# Patient Record
Sex: MALE | Race: WHITE
[De-identification: names, ages, dates, MRNs, and addresses within clinical notes are randomized per-mention and may not be internally consistent; named-entity substitution may affect disease eponyms.]

---

## 2019-11-01 ENCOUNTER — HOSPITAL ENCOUNTER (OUTPATIENT)
Dept: HOSPITAL 84 - D.HCCECHO | Age: 84
Discharge: HOME | End: 2019-11-01
Attending: INTERNAL MEDICINE
Payer: MEDICARE

## 2019-11-01 DIAGNOSIS — R09.89: ICD-10-CM

## 2019-11-01 DIAGNOSIS — I10: Primary | ICD-10-CM

## 2019-11-06 NOTE — EC
PATIENT:KARLEE CROCKER                 DATE OF SERVICE: 11/01/19
SEX: M                                  MEDICAL RECORD: [id number]
DATE OF BIRTH: [birth year]                        [facility]          
AGE OF PATIENT: 84                             ADMISSION DATE: 11/01/19
 
REFERRING PHYSICIAN:                               
 
INTERPRETING PHYSICIAN: MILY MEDINA MD          
 
 
 
                             ECHOCARDIOGRAM REPORT
  ECHO CHARGES 4               ECHO COMPLETE                 Date: 11/01/19
 
 
 
CLINICAL DIAGNOSIS: ANGINA/PACK/MURMUR/ABNORMAL EKG
 
                         ECHOCARDIOGRAPHIC MEASUREMENTS
      (adult normal given)
   AC root (d.<3.7cm) 3.5  cm   LV Septum d (<1.2 cm> 1.1  cm
      Valve Excursion 1.9  cm     LV Septum (systole) 1.4  cm
Left Atria (s.<4.0cm> 2.4  cm          LVPW d(<1.2cm) 1.1  cm
        RV (d.<2.3cm) 2.7  cm           LVPW (sytole) 1.7  cm
  LV diastole(<5.6CM) 6.2  cm       MV E-F(>70mm/sec)      cm
           LV systole 3.8  cm           LVOT Diameter 1.8  cm
       MV exc.(>10mm)      cm
Est.ejection fraction (50-75%)     %
 
   DOPPLER:
     LVIT      cm/sec A 72.0 cm/sec E 104   cm/sec
       LA      cm/sec      RVSP 33.0 mmHg
     LVOT 135  cm/sec   AOP1/2T      m/s
  Asc. Ao 161  cm/sec
     RVOT 63.0 cm/sec
       RA      cm/sec
       PA 76.0 cm/sec
 AV Gradient Peak 10.4 mmHg  AV Mean 5.1  mmHg  AV Area 2.4  cm
 MV Gradient Peak 5.9  mmHg  MV Mean 2.0  mmHg  MV Area      cm
   COMMENTS: OP - HC                                      
 
 
 Cardiac Sonographer: 1               LYN Sterling            
      Cardiologist: 3          Dr. Garcia             
             TAPE# PACS           
                                       Pericardial Effusion N                        
 
 
DATE OF SERVICE:  
 
Adequate 2D, color flow, spectral Doppler, and M-Mode.  No LVH.  LV internal
dimensions are mildly dilated at 6.2 cm.  LV wall motion is normal.  EF is
greater than or equal to 55%.  The aortic valve is tricuspid.  No evidence of
stenosis by Doppler interrogation.  Left atrium normal at 3.4 cm.  Mitral valve
shows no prolapse.  Trace MR.  Right-sided chambers are grossly normal.  Trace
TR.
 
TRANSINT:[id number] Voice Confirmation ID: [id number] DOCUMENT ID: [id number]
 
 
 
ECHOCARDIOGRAM REPORT                          [id number]    KARLEE CROCKER GREGORY A MD          
 
 
 
Electronically Signed by MILY MEDINA on 11/06/19 at 1338
 
 
 
 
 
 
 
 
 
 
 
 
 
 
 
 
 
 
 
 
 
 
 
 
 
 
 
 
 
 
 
 
 
 
 
 
 
 
 
 
CC:                                                             [id number]
DICTATION DATE: 11/04/19 1245     :     11/04/19 1309      DEP CLI 
                                                                      11/01/19
[place] [id number] [place] [id number]

## 2020-04-18 ENCOUNTER — HOSPITAL ENCOUNTER (INPATIENT)
Dept: HOSPITAL 84 - D.ER | Age: 85
LOS: 4 days | Discharge: HOME | DRG: 243 | End: 2020-04-22
Attending: FAMILY MEDICINE | Admitting: FAMILY MEDICINE
Payer: MEDICARE

## 2020-04-18 VITALS — SYSTOLIC BLOOD PRESSURE: 183 MMHG | DIASTOLIC BLOOD PRESSURE: 85 MMHG

## 2020-04-18 VITALS — DIASTOLIC BLOOD PRESSURE: 77 MMHG | SYSTOLIC BLOOD PRESSURE: 171 MMHG

## 2020-04-18 VITALS — SYSTOLIC BLOOD PRESSURE: 176 MMHG | DIASTOLIC BLOOD PRESSURE: 79 MMHG

## 2020-04-18 VITALS
HEIGHT: 68 IN | BODY MASS INDEX: 18.68 KG/M2 | WEIGHT: 123.26 LBS | BODY MASS INDEX: 18.68 KG/M2 | WEIGHT: 123.26 LBS | HEIGHT: 68 IN

## 2020-04-18 VITALS — DIASTOLIC BLOOD PRESSURE: 86 MMHG | SYSTOLIC BLOOD PRESSURE: 216 MMHG

## 2020-04-18 VITALS — SYSTOLIC BLOOD PRESSURE: 183 MMHG | DIASTOLIC BLOOD PRESSURE: 72 MMHG

## 2020-04-18 DIAGNOSIS — N17.9: ICD-10-CM

## 2020-04-18 DIAGNOSIS — I49.5: Primary | ICD-10-CM

## 2020-04-18 DIAGNOSIS — E78.5: ICD-10-CM

## 2020-04-18 DIAGNOSIS — I25.10: ICD-10-CM

## 2020-04-18 DIAGNOSIS — I44.4: ICD-10-CM

## 2020-04-18 DIAGNOSIS — I16.1: ICD-10-CM

## 2020-04-18 LAB
ALBUMIN SERPL-MCNC: 4 G/DL (ref 3.4–5)
ALP SERPL-CCNC: 86 U/L (ref 30–120)
ALT SERPL-CCNC: 26 U/L (ref 10–68)
ANION GAP SERPL CALC-SCNC: 11.7 MMOL/L (ref 8–16)
BILIRUB SERPL-MCNC: 0.61 MG/DL (ref 0.2–1.3)
BUN SERPL-MCNC: 26 MG/DL (ref 7–18)
CALCIUM SERPL-MCNC: 9.1 MG/DL (ref 8.5–10.1)
CHLORIDE SERPL-SCNC: 103 MMOL/L (ref 98–107)
CK MB SERPL-MCNC: 1.9 U/L (ref 0–3.6)
CO2 SERPL-SCNC: 30.2 MMOL/L (ref 21–32)
CREAT SERPL-MCNC: 0.7 MG/DL (ref 0.6–1.3)
EOSINOPHIL NFR BLD: 1 % (ref 0–7)
ERYTHROCYTE [DISTWIDTH] IN BLOOD BY AUTOMATED COUNT: 14.8 % (ref 11.5–14.5)
GLOBULIN SER-MCNC: 3.5 G/L
GLUCOSE SERPL-MCNC: 95 MG/DL (ref 74–106)
HCT VFR BLD CALC: 42.8 % (ref 42–54)
HGB BLD-MCNC: 14.1 G/DL (ref 13.5–17.5)
LYMPHOCYTES NFR BLD AUTO: 63 % (ref 15–50)
MCH RBC QN AUTO: 31.9 PG (ref 26–34)
MCHC RBC AUTO-ENTMCNC: 32.9 G/DL (ref 31–37)
MCV RBC: 96.8 FL (ref 80–100)
NEUTROPHILS NFR BLD AUTO: 36 % (ref 40–80)
NT-PROBNP SERPL-MCNC: 114 PG/ML (ref 0–450)
OSMOLALITY SERPL CALC.SUM OF ELEC: 285 MOSM/KG (ref 275–300)
PLATELET # BLD EST: NORMAL 10*3/UL
PLATELET # BLD: 157 10X3/UL (ref 130–400)
PMV BLD AUTO: 11.3 FL (ref 7.4–10.4)
POTASSIUM SERPL-SCNC: 3.9 MMOL/L (ref 3.5–5.1)
PROT SERPL-MCNC: 7.5 G/DL (ref 6.4–8.2)
RBC # BLD AUTO: 4.42 10X6/UL (ref 4.2–6.1)
SODIUM SERPL-SCNC: 141 MMOL/L (ref 136–145)
TROPONIN I SERPL-MCNC: < 0.017 NG/ML (ref 0–0.06)
WBC # BLD AUTO: 7 10X3/UL (ref 4.8–10.8)

## 2020-04-18 NOTE — HEMODYNAMI
PATIENT:KARLEE CROCKER                                  MEDICAL RECORD: [id number]
: 35                                            LOCATION:[place] [address]
ACCT# [id number]                                       ADMISSION DATE: 20
 
 
 Generatedon:202014:26
Patient name: KARLEE CROCKER Patient #: [id number] Visit #: [id number] SSN: [id number]
[id number] :
1935 Date of study: 2020
Page: Of
Hemodynamic Procedure Report
****************************
Patient Data
Patient Demographics
Procedure consent was obtained
First Name: KARLEE           Gender: Male
Last Name:           : 1935
Middle Initial: L           Age: 84 year(s)
Patient #: [id number]       Race: 
Visit #: [id number]
SSN: [id number]
Accession #:
[id number]
Additional ID: [id number]
Contact details
Address: [address] [place] [address]       Phone: [phone]
State: [place]
City: [place]
Zip code: [address]
Past Medical History
Allergies
Allergen        Reaction        Date         Comments
Reported
Other allergy                   2020    BIAXIN,
AZITHROMYCIN,
HYDRALIZINE,
DILTIAZEM
Admission
Admission Data
Admission Date: 2020   Admission Time: 17:30
Arrival Date: 2020     Arrival Time: 0:00
Admit Source: Other         Insurance Payor: Medicare
Room #: [address] #: [id number]
Height (in.): 67.72         BSA: 1.7 (m2)
Height (cm.): 172           BMI: 19.94 (kg/m2)
Weight (lbs.): 130.07
Weight (kg.): 59
Lab Results
Lab Result Date: 2020  Lab Result Time: 0:00
Biochemistry
Name         Units    Result                Min      Max
BUN          mg/dl    24       --(----)-*   7        18
Creatinine   mg/dl    1        --(--*-)--   0.6      1.3
eGFR         ml/min   76.70622 *-(----)--   90       120
NONAFRICAN
CBC
Name         Units    Result                Min      Max
Hemoglobin   g/dl     14.7     --(-*--)--   13.5     17.5
Procedure
 
Procedure Types
Cath Procedure
Diagnostic Procedure
PPM/ICD
PPM Dual Implant
Sedation Charges
Moderate Sedation up to 30 minutes
Procedure Description
Procedure Date
Procedure Date: 2020
Procedure Start Time: 13:35
Procedure End Time: 14:24
Procedure Staff
Name                            Function
Henry Perez MD              Performing Physician
Manny Vallecillo MD             Assisting physician
Aracely Ospina RT              Monitor
Carole Villafuerte RT               Scrub
Jaylin Sorto RN                Nurse
Rashaun Pierre RN                  Circulator
Procedure Data
Cath Procedure
Fluoroscopy
Diagnostic fluoroscopy      Total fluoroscopy Time:
time: 11.6 min              11.6 min
Diagnostic fluoroscopy      Total fluoroscopy dose:
dose: 93.54 mGy             93.54 mGy
Estimated blood loss: 10 ml
Procedure Complications
No complications
Procedure Medications
Medication           Administration Route Dosage
0.9% NaCl            I.V.                 100 ml/hr
Oxygen               etCO2 Nasal cannula  2 l/min
Lidocaine 1%         added to field       20
Ancef (1Gm/50ml NS)  I.V.P.B              1 g
Ancef Irrigation     Topical              1 g
(1gm/500ml NS)
Versed               I.V.                 2 mg
Fentanyl             I.V.                 50 mcg
Hemodynamics
Rest
BSA: 1.7 (m2) HGB: 14.7 (g/dl) O2 Consumption: Estimated: 199.91 (ml/min) O2 Con
sumption
indexed: Estimated:117.59 (ml/min/m) Heart Rate: 80 (bpm)
Snapshots
Pre Cath      Intra         NCS           Post Cath
Vital Signs
Time     Heart  Resp   SPO2 etCO2   NIBP (mmHg) Rhythm  Pain    Sedation
Rate   (ipm)  (%)  (mmHg)                      Status  Level
(bpm)
13:22:39 83     11     98   26.2    160/97(140) NSR     0 (11)  10(A)
, No
pain
13:26:51 79     13     97   18.7    145/81(109) NSR     0 (11)  10(A)
, No
pain
13:30:59 83     24     97   23      135/80(107) NSR     0 (11)  10(A)
 
, No
pain
13:35:02 81     11     97   22.5    134/80(111) NSR     0 (11)  10(A)
, No
pain
13:39:04 98     14     98   15.7    137/81(109) NSR     0 (11)  9(A)
, No
pain
13:43:08 84     21     97   17.2    137/81(116) NSR     0 (11)  9(A)
, No
pain
13:47:11 80     12     98   3.7     133/83(107) NSR     0 (11)  10(A)
, No
pain
13:51:17 92     8      97   1.5     92/70(86)   NSR     0 (11)  9(A)
, No
pain
13:55:52 76     16     98   0.7     130/81(100) NSR     0 (11)  9(A)
, No
pain
13:59:57 74     10     97   4.4     141/71(102) NSR     0 (11)  9(A)
, No
pain
14:04:05 71     16     98   1.4     137/77(102) NSR     0 (11)  9(A)
, No
pain
14:08:11 71     8      97   3.7     140/79(114) NSR     0 (11)  9(A)
, No
pain
14:12:17 80     9      97   12.7    132/82(110) NSR     0 (11)  10(A)
, No
pain
14:14:36 79     8      98   5.2     134/78(103) NSR     0 (11)  10(A)
, No
pain
14:18:40 73     11     98   3.7     137/77(116) NSR     0 (11)  10(A)
, No
pain
14:22:39 79     15          0       136/90(117) NSR     0 (11)  10(A)
, No
pain
Medications
Time     Medication  Route   Dose  Verified  Delivered Reason     Notes  Effecti
veness
by        by
13:18:47 0.9% NaCl   I.V.    100   Henry   Jaylin     used for
ml/hr Chris   Macario   procedure
MD        RN
13:18:53 Oxygen      etCO2   2     Henry   Jaylin     used for
Nasal   l/min Chris   Macario   procedure
cannula       MD        RN
13:19:03 Lidocaine   added   20ml  Manny Bryant for local
1%          to      vial  Ismael Vallecillo MD anesthetic
field   x 2
13:19:12 Ancef       I.V.P.B 1 g   Church Jaylin     used for
(1Gm/50ml                 Ismael Sorto   procedure
NS)                                 RN
13:19:19 Ancef       Topical 1 g   [facility] Jaylin     used for
Irrigation                Ismael Sorto   procedure
(1gm/500ml                          RN
 
NS)
13:29:51 Versed      I.V.    2 mg  Henry   Jaylin     for
Chris   Macario   sedation
MD        RN
13:29:56 Fentanyl    I.V.    50    Henry conn
mcg   [facility] Sorto   sedation
MD        RN
13:35:18 Fentanyl    I.V.    50    Henry conn
mcg   [facility] Sorto   sedation
MD        RN
13:49:50 Fentanyl    I.V.    50    Henry Arciniega MD, RN
Procedure Log
Time     Note
12:13:41 Informed consent obtained and on chart
12:13:47 Patient Weight : 130.07 lbs
12:13:47 Patient Height : 67.72 inches
13:01:54 Procedure Status PPM/ Gen Change/ Lead Revision/ Temp.
13:01:56 Time tracking: Regular hours (M-F 7:00 - 5:00)
13:01:59 Plan of Care:Hemodynamics will remain stable., Cardiac
rhythm will remain stable., Comfort level will be
maintained., Respiratory function will remain
adequate., Patient/ family verbilizes understanding of
procedure., Procedure tolerated without complication.,
Recovers from procedure without complications..
13:02:13 Rashaun Pierre RN sent for patient. Start room use.
13:02:25 H&P Date Dictated: 2020 ER History on chart..
13:03:01 Patient allergic to Other allergyBIAXIN, AZITHROMYCIN,
HYDRALIZINE, DILTIAZEM
13:04:05 Medtr[employer] representative MILTON PHILIP present for
procedure.
13:12:58 Patient received from Med II to CCL 3 Alert and
oriented. Tansferred to table in Supine position.
13:13:01 Warm blankets applied, and dieter hugger turned on for
patient comfort.
13:13:01 Correct patient and procedure confirmed by team.
13:13:02 ECG and BP/O2 sat monitors applied to patient.
13:18:38 Vital chart was started
13:18:47 0.9% NaCl 100 ml/hr I.V. was administered by Jaylin Sorto RN; used for procedure; Verbal order read back
and verified.
13:18:53 Oxygen 2 l/min etCO2 Nasal cannula was administered by
Jaylin Sorto RN; used for procedure; Verbal order
read back and verified.
13:19:03 Lidocaine 1% 20ml vial x 2 added to field was
administered by Manny Vallecillo MD; for local
anesthetic; Verbal order read back and verified.
13:19:12 Ancef (1Gm/50ml NS) 1 g I.V.P.B was administered by
Jaylin Sorto RN; used for procedure; Verbal order
read back and verified.
13:19:19 Ancef Irrigation (1gm/500ml NS) 1 g Topical was
administered by Jaylin Sorto RN; used for procedure;
Verbal order read back and verified.
13::53 Baseline sample Acquired.
13:27:20 Full Disclosure recording started
13::22 Pre-procedure instructions explained to patient.
13::23 Pre-op teaching completed and patient verbalized
understanding.
13:27:25 Family unavailable.
 
13:27:28 Patient NPO since Midnight.
13:27:35 Is the patient allergic to Iodine/contrast media? No.
13:27:38 Was the patient premedicated? Yes
13:27:45 Is patient on blood thinner?No
13:27:52 Patient diabetic? No.
13:27:54 ----Pre-sedation anethsthesia assessment.----
13:27:57 Previous problem with sedation/anesthesia? No ?
13:27:59 Snore? Yes
13:28:01 Sleep apnea? No
13:28:04 Deviated septum? Unknown
13:28:07 Opens mouth fully? Yes
13:28:11 Sticks out tongue? Yes
13:28:15 Airway obstruction? No ?
13:28:18 Dentures? No ?
13:28:35 IV patent on arrival in left forearm with 0.9% NaCl at
[facility].
13:28:47 Lab results completed and on chart.
13:28:58 Left chest area was prepped with chlora-prep and
draped in sterile fashion
13:29:00 Alarms reviewed by R. N.
13:29:01 Sharps counted by scrub and verified by R.N.
13:29:05 Physician arrived
13:29:06 --------ALL STOP TIME OUT------
13:29:08 Final Timeout: patient, procedure, and site verified
with staff and physician. All members of the team are
in agreement.
13:29:13 Left chest site verified by team.
13:29:27 Fire Safety Assessment: A--An alcohol-based skin
anteseptic being used preoperatively., B--The
operative or invasive procedure is being performed
above the xiphoid process or in the oropharynx.,
C--Open oxygen or nitrous oxide is being used.,
E--There are other possible contributors.
13:29:33 Physical assessment completed. ASA score P 2 - A
patient with mild systemic disease as per Henry Perez MD.
13:29:39 Sedation plan: IV Moderate Sedation Medication:Versed,
Fentanyl
13:29:50 Use device set ISMAEL PPM
13:29:51 Versed 2 mg I.V. was administered by Jaylin Sorto RN;
for sedation; Verbal order read back and verified.
13:29:53 2-0 Ticron Multipack ([id number]) opened to sterile
field.
13:29:53 3-0 Vicryl Single Pack [id number] opened to sterile
field.
13:29:54 5-0 Monocryl PS2 Y495G opened to sterile field.
13:29:55 Cautery Tip  opened to sterile field.
13:29:56 Fentanyl 50 mcg I.V. was administered by Jaylin Sorto
RN; for sedation; Verbal order read back and verified.
13:29:56 Cautery Pushbutton Pencil opened to sterile field.
13:29:57 Mepilex Dressing (994027) opened to sterile field.
13:30:38 Pre sharps counted by scrub and verified by RN:
Sutures: 7; Sponges: 5; Stick needles: 2; Skin
needles: 2; Blade: 1; Cautery: 1
13:30:51 Grounding pad site Left thigh.
13:30:53 Grounding pad site free from injury.
13:33:39 Procedure started.
13:33:49 Rhythm: sinus rhythm
13:34:41 Medtronic 4574-45 PPM Lead opened to sterile field.
13:34:46 Medtronic 4074-52 PPM Lead opened to sterile field.
 
13:35:13 Medtronic ESTEBAN XT DR Generator [id number] opened to
sterile field.
13:35:18 Fentanyl 50 mcg I.V. was administered by Jaylin Sorto
RN; for sedation; Verbal order read back and verified.
13:35:38 Lidocaine 1% was administered to left subclavicular
area by Manny Vallecillo MD .
13:35:44 Incision made to left subclavicular area.
13:38:10 Generator pocket made.
13:42:10 Left subclavian vein accessed with 7Fr Peel Away
Sheath.
13:42:15 Left subclavian vein accessed with 7Fr Peel Away
Sheath.
13:46:23 Ventricular lead inserted and advanced.
13:46:27 Atrial lead inserted and advanced.
13:49:40 Immobilizer Sling Medium opened to sterile field.
13:49:50 Fentanyl 50 mcg I.V. was administered by Jaylin Sorto RN; for sedation; Verbal order read back and verified.
13:51:41 Ventricular lead positioned.
13:51:46 Ventricular lead tested.
14:05:50 Atrial lead positioned.
14:05:59 Atrial lead tested.
14:07:28 Peel-a-way sheath was split and removed.
14:07:28 Peel-a-way sheath was split and removed.
14:10:30 PPM Dual was attached to lead(s) and inserted into
pocket.
14:10:35 PPM Dual was inserted subcutaneously to left chest.
14:10:59 Device pocket was irrigated with Ancef.
14:11:09 Atrial lead attachment was completed with 2-0 ticron.
14:11:15 Ventricular lead attachment was completed with 2-0
ticron.
14:11:21 Generator was sutured in place with 2-0 ticron.
14:15:02 Subcutaneous closure was completed with 3-0 vicryl
plus.
14:16:25 Skin closure was completed with 5-0 monocryl.
14:16:57 Parameters-- Generator: Mode: DDDR. Lower Rate: 60bpm.
Upper Rate: 120bpm.
14:17:42 Parameters--Ventricular P/R Wave: 11.2mV. Current:
0.1mA; Threshold: 1441V; Impedence: 1441OHMS.
14:18:38 Parameters--Atrial P/R Wave: 3.7mV. Current: 9.6mA;
Threshold: 9.4V; Impedence: 699OHMS.
14:19:36 Lt Chest incision was dressed with Mepilex dressing.
14:19:45 Procedure ended.(Physican Out)
14:21:08 Fluoroscopy time 11.60 minutes.
14:22:10 Fluoroscopy dose: 93.54 mGy
14:22:10 Flurop Dose total: 93.54
14:22:21 Dose Area Product 2577.60 mGy/cm.
14:22:25 Sharps counted by scrub and verified by R.N.
14:22:49 Post sharps counted by scrub and verified by RN:
Sutures: 7; Sponges: 5; Stick needles: 2; Skin
needles: 2; Blade: 1; Cautery: 1
14:23:03 Insertion/operative site no bleeding no hematoma.
14:23:14 Post Chest area:stable
14:23:20 Post-procedure physical assessment completed. ASA
score P 2 - A patient with mild systemic disease as
per Henry Perez MD.
14:23:25 Post procedure rhythm: paced
14:23:30 Estimated blood loss: 10 ml
14:23:32 Post procedure instruction explained to
patient.Patient verbalizes understanding.
14:23:33 Patient needs reinforcement of post procedure
 
teaching.
14:23:44 Procedure type changed to Cath procedure, Diagnostic
procedure, PPM/ICD, PPM Dual Implant, Sedation
Charges, Moderate Sedation up to 30 minutes
14:23:47 Procedure and supply charges have been captured,
reviewed, submitted and are correct.
14:24:33 Procedure Complication : No complications
14:24:36 Vital chart was stopped
14:24:41 Operative report dictated upon procedure completion.
14:24:42 See physician's report for complete and final results.
14:24:44 Report given to [facility] II.
14:24:52 Patient transfered to Med II with Bed.
14:24:56 Procedure ended.
14:24:56 Full Disclosure recording stopped
14:25:00 End room use (Document Last)
Device Usage
Item Name    Manufacture  Quantity  Catalog     Hospital Part     Current [facility]
l Lot# /
Number      Charge   Number   Stock   Stock   Serial#
Code
2-0 Ticron   [employer]      1         [id number]  881506   46415    930658  5
Multipack
([id number])
3-0 Vicryl   [employer]      1         [id number]     896033   149316   376174  5
Single Pack
[id number]
5-0 Monocryl [employer]      1         Y495G       493263   540318   305799  5
PS2 Y495G
Cautery Tip  [employer]     1         [id number]    899209   253712   429287  5
[employer]
Cautery      [employer]     1         [id number]      512991   34516    845263  5
Pushbutton   [employer]
Pencil
Mepilex      Cardinal     1         986363      784108   517555   679366  5
Dressing     Health
(549411)
Medtronic    Medtronic    1         4574-45     514172   929406   347807  5     
  [id number]
4574-45 PPM                                                                     
  2021
Lead
Medtronic    Medtronic    1         4074-52     530302   407280   895973  5     
  [id number]
4074-52 PPM                                                                     
  2022
Lead
Medtronic    Medtronic    1         W1DR01      447682   0114635  936178  5     
  [id number]
ESTEBAN XT DR                                                                     
  2021
Generator
W1DR01
Immobilizer  Cardinal     1         [id number]    038838   266316   072301  5
Sling Medium Health
Signature Audit Cameron
Stage           Time        Signature      Unsigned
Intra-Procedure 2020   Aracely
2:25:25 PM  Anai
RT(R) (CV)
Intra-Procedure 2020   Jaylin Sorto
 
2:26:18 PM  RN
Intra-Procedure 2020   Henry Olson
2:26:55 PM  Ori REYES
 
 
 
 
 
 
 
 
 
 
 
 
 
 
 
 
 
 
 
 
 
 
 
 
 
 
 
 
 
 
 
 
 
 
 
 
 
 
 
 
 
 
 
 
 
 
 
 
 
 
 
 
[place] [address] [place] [address]

## 2020-04-18 NOTE — HEMODYNAMI
PATIENT:KARLEE CROCKER                                  MEDICAL RECORD: [id number]
: 35                                            LOCATION:[facility]     D.2123
ACCT# [id number]                                       ADMISSION DATE: 20
 
 
 Generatedon:202010:48
Patient name: KARLEE CROCKER Patient #: [id number] Visit #: [id number] SSN: [id number]
[id number] :
1935 Date of study: 2020
Page: Of
Hemodynamic Procedure Report
****************************
Patient Data
Patient Demographics
Procedure consent was obtained
First Name: KARLEE           Gender: Male
Last Name:           : 1935
Middle Initial: L           Age: 84 year(s)
Patient #: [id number]       Race: 
Visit #: [id number]
SSN: [id number]
Accession #:
[id number]
Additional ID: [id number]
Contact details
Address: [address] [place] [address]       Phone: [phone]
State: [place]
City: [place]
Zip code: [address]
Admission
Admission Data
Admission Date: 2020   Admission Time: 17:30
Arrival Date: 2020     Arrival Time: 0:00
Admit Source: Other         Insurance Payor: Medicare
Room #: D.2123              [facility] #: [id number]
Height (in.): 67.72         BSA: 1.7 (m2)
Height (cm.): 172           BMI: 19.94 (kg/m2)
Weight (lbs.): 130.07
Weight (kg.): 59
Lab Results
Lab Result Date: 2020  Lab Result Time: 0:00
Biochemistry
Name         Units    Result                Min      Max
BUN          mg/dl    24       --(----)-*   7        18
Creatinine   mg/dl    1        --(--*-)--   0.6      1.3
eGFR         ml/min   76.77148 *-(----)--   90       120
NONAFRICAN
CBC
Name         Units    Result                Min      Max
Hemoglobin   g/dl     14.7     --(-*--)--   13.5     17.5
Procedure
Procedure Types
Cath Procedure
Diagnostic Procedure
C
LH w/Coronaries
Sedation Charges
Moderate Sedation up to 30 minutes
PCI Procedure
 
Coronary Stent
Coronary Stent Initial
Hemochron ACT Test
Peripheral Cath Diagnostic Procedure
Cath Lab Peripheral Procedures
Renal Arteriogram
Procedure Description
Procedure Date
Procedure Date: 2020
Procedure Start Time: 10:19
Procedure End Time: 10:44
Procedure Staff
Name                            Function
Henry Perez MD              Performing Physician
Shayy Valle RT                    Monitor
Jazmyne Tompkins RT                Scrub
Rashaun Pierre RN                  Nurse
Procedure Data
Cath Procedure
Fluoroscopy
Diagnostic fluoroscopy      Total fluoroscopy Time: 5.6
time: 5.6 min               min
Diagnostic fluoroscopy      Total fluoroscopy dose: 646
dose: 646 mGy               mGy
Contrast Material
Contrast Material Type                       Amount (ml)
Isovue 300                                   149
Entry Location
Entry     Primary  Successful  Side  Size  Upsize Upsize Entry    Closure Succes
sful  Closure
Location                             (Fr)  1 (Fr) 2 (Fr) Remarks  Device        
      Remarks
Femoral                        Right 5 Fr  6 Fr                   Exoseal
artery                                     Short
Estimated blood loss: 5 ml
Diagnostic catheters
Device Type               Used For           End Catheter
Placement
MULTIPACK JL 4.0 5Fr      Left Coronary
catheter                  Angiography
MULTIPACK 3DRC 5Fr        Right Coronary
catheter                  Angiography
MULTIPACK Pigtail 5 Fr    Right Coronary
catheter                  Angiography
MULTIPACK 3DRC 5Fr        Right Coronary
catheter                  Angiography
Procedure Complications
No complications
Procedure Medications
Medication           Administration Route Dosage
Oxygen               etCO2 Nasal cannula  2 l/min
Lidocaine 2%         added to field       20
Heparin Flush Bag    added to field       2 bags
(1000units/500ml NS)
0.9% NaCl            I.V.                 100 ml/hr
Versed               I.V.                 1 mg
Fentanyl             I.V.                 50 mcg
Heparin Bolus        I.V.                 4000 units
Integrilin (Bolus    I.V.                 5 ml
2mg/ml)
 
Plavix               P.O.                 600 mg
Hemodynamics
Rest
BSA: 1.7 (m2) HGB: 14.7 (g/dl) O2 Consumption: Estimated: 189.18 (ml/min) O2 Con
sumption indexed:
Estimated:111.28 (ml/min/m) Heart Rate: 64 (bpm)
Pressure Samples
Time     Site     Value (mmHg) Purpose      Heart      Use
Rate(bpm)
10:26    LV       120/-11,-11  Snapshot     66
Gradients
Valve  Time  Site Site Mean    SEP/DFP    Peak To Heart  Use
1    2    (mmHg)  (sec/min)  Peak    Rate
(mmHg)  (bpm)
Aortic 10:26 LV   AO                              64
Snapshots
Pre Cath      Intra         NCS           Post Cath
Vital Signs
Time     Heart  Resp   SPO2 etCO2   NIBP (mmHg) Rhythm  Pain    Sedation
Rate   (ipm)  (%)  (mmHg)                      Status  Level
(bpm)
10:13:34 70     15     97   15.6    159/84(122) NSR     0 (11)  10(A)
, No
pain
10:17:52 69     16     94   15.6    143/75(105) NSR     0 (11)  10(A)
, No
pain
10:22:06 65     15     97   0       143/74(121) NSR     0 (11)  9(A)
, No
pain
10:27:21 67     12     98   0       142/76(126) NSR     0 (11)  9(A)
, No
pain
10:32:45 60     12     98   13.4    151/67(135) NSR     0 (11)  9(A)
, No
pain
10:37:01 64     13     98   8.2     152/77(133) NSR     0 (11)  9(A)
, No
pain
10:42:23 67     13     98   38.1    155/84(139) NSR     0 (11)  10(A)
, No
pain
Medications
Time     Medication       Route   Dose  Verified Delivered Reason          Notes
    Effectiveness
by       by
10:10:31 Oxygen           etCO2   2     Henry Rodney    used for
Nasal   l/min St Ori Pierre RN   procedure
cannula       MD
10:10:41 Lidocaine 2%     added   20ml  Henry Figueroa   for local
to      vial  [facility]   anesthetic
field MD REYES
10:10:48 Heparin Flush    added   2     Henry Figueroa   used for
Bag              to      bags  [facility]   procedure
(1000units/500ml field         MD       MD
NS)
10:10:57 0.9% NaCl        I.V.    100   Hnery Rodney    Per physician
ml/hr [facility] Ori Pierre RN, MD
10:15:40 Versed           I.V.    1 mg  Henry Rodney    for sedation
 
[facility] Ori Pierre RN, MD
10:15:46 Fentanyl         I.V.    50    Henry Rodney    for sedation
mcg   [facility] Ori Pierre RN, MD
10:25:15 Heparin Bolus    I.V.    4000  Henry Rodney    for             verif
ied
units St Ori Pierre RN   anticoagulation with dr MD mccann
10:29:32 Integrilin       I.V.    5 ml  Henry Rodney    for             waste
d 5
(Bolus 2mg/ml)                 St Ori Pierre RN   antiplatelet    ml of
MD                 therapy         vial
10:43:00 Plavix           P.O.    600   Henry Rodney    for
mg    Chris  Bell RN   antiplatelet
MD                 therapy
Procedure Log
Time     Note
9:40:53  Informed consent obtained and on chart
9:41:10  Diagnostic Cath Status : Urgent
9:41:27  Arrival Date: 2020 12:00:00 AM
9:41:28  Admit Source: Other
9:41:30  Insurance Payor : Medicare
9:42:05  Procedure Status Urgent Heart Cath (IP), Peripheral.
9:42:10  Time tracking: Regular hours (M-F 7:00 - 5:00)
9:42:15  Plan of Care:Hemodynamics will remain stable., Cardiac
rhythm will remain stable., Comfort level will be
maintained., Respiratory function will remain
adequate., Patient/ family verbilizes understanding of
procedure., Procedure tolerated without complication.,
Recovers from procedure without complications..
9:52:03  Rashaun Pierre RN sent for patient. Start room use.
9:54:17  Lab Result : BUN 24 mg/dl
9:54:17  Lab Result : eGFR NONAFRICAN 76.67520 ml/min
9:54:17  Lab Result : Hemoglobin 14.7 g/dl
9:54:17  Lab Result : Creatinine 1 mg/dl
9:56:18  Risk of Mortality: 0.1
9:56:23  Risk of blood transfusion: 1.7
9:56:27  Risk of LYNDSEY: 3.3
9:58:57  Patient Height : 67.72 inches
9:59:03  Patient Weight : 130.07 lbs
10:00:43 Patient received from Med II to CCL 1 Alert and
oriented. Tansferred to table in Supine position.
10:00:44 Warm blankets applied, and dieter hugger turned on for
patient comfort.
10:00:45 Correct patient and procedure confirmed by team.
10:00:46 ECG and BP/O2 sat monitors applied to patient.
10:10:31 Oxygen 2 l/min etCO2 Nasal cannula was administered by
Rashaun Pierre RN; used for procedure; Verbal order read
back and verified.
10:10:41 Lidocaine 2% 20ml vial added to field was administered
by Henry Perez MD; for local anesthetic; Verbal
order read back and verified.
10:10:48 Heparin Flush Bag (1000units/500ml NS) 2 bags added to
field was administered by Henry Perez MD; used for
procedure; Verbal order read back and verified.
10:10:57 0.9% NaCl 100 ml/hr I.V. was administered by Rashaun Pierre RN; Per physician; Verbal order read back and
verified.
10:12:25 Baseline sample Acquired.
 
10:12:25 Vital chart was started
10:12:30 Rhythm: sinus rhythm
10:12:33 Full Disclosure recording started
10:12:52 IV Extension Set opened to sterile field.
10:12:59 Pre-procedure instructions explained to patient.
10:13:00 Pre-op teaching completed and patient verbalized
understanding.
10:13:02 Family unavailable.
10:13:03 Patient NPO since Midnight.
10:13:06 Is the patient allergic to Iodine/contrast media? No.
10:13:08 Was the patient premedicated? Yes
10:13:11 Is patient on blood thinner?No
10:13:13 Patient diabetic? No.
10:13:25 Previous problem with sedation/anesthesia? No ?
10:13:27 Snore? No
10:13:28 Sleep apnea? No
10:13:29 Deviated septum? No
10:13:31 Opens mouth fully? Yes
10:13:32 Sticks out tongue? Yes
10:13:34 Airway obstruction? No ?
10:13:37 Dentures? No ?
10:13:40 Pre procedure: right dorsailis pedis pulse 2+ Normal;
easily identifiable; not easily obliterated
10:13:43 Pre procedure: left dorsailis pedis pulse 2+ Normal;
easily identifiable; not easily obliterated
10:13:45 Patient pain scale 0/10 ?.
10:13:54 IV patent on arrival in left forearm with 0.9% NaCl at
KVO.
10:13:57 Lab results completed and on chart.
10:14:09 Stress Test: no; N/A ?
10:14:12 Alarms reviewed by R. N.
10:14:13 Sharps counted by scrub and verified by R.N.
10:14:14 Physician arrived
10:14:15 --------ALL STOP TIME OUT------
10:14:16 Final Timeout: patient, procedure, and site verified
with staff and physician. All members of the team are
in agreement.
10:14:18 Right groin site verified by team.
10:14:22 Fire Safety Assessment: A--An alcohol-based skin
anteseptic being used preoperatively., C--Open oxygen
or nitrous oxide is being used., D--An ESU, laser, or
fiber-optic light is being used.
10:14:26 Physical assessment completed. ASA score P 2 - A
patient with mild systemic disease as per Henry Perez MD.
10:14:29 2) 60-89 Mildly reduced kidney function, and other
findings (as for stage 1) point to kidney disease.
10:14:50 Maximum allowable contrast dose (3.7 X eGFR X 0.75)210
ml.
10:14:55 Sedation plan: IV Moderate Sedation Medication:Versed,
Fentanyl
10:14:59 Use device set Femoral Dx
10:15:01 ACIST Syringe (58774) opened to sterile field.
10:15:02 Bag Decanter (2002S) opened to sterile field.
10:15:02 Medline Cath Pack ([id number]) opened to sterile field.
10:15:03 ACIST Hand Control (35898) opened to sterile field.
10:15:04 ACIST Manifold (33267) opened to sterile field.
10:15:04 DIAGNOSTIC Multipack 5Fr catheter set (WK7971) opened
to sterile field.
10:15:05 Tegaderm 4 x 4 (1626W) opened to sterile field.
 
10:15:06 SHEATH 5FR [place] (YIU229) opened to sterile field.
10:15:06 EMERALD Guide Wire (585-999) opened to sterile field.
10:15:40 Versed 1 mg I.V. was administered by Rashaun Pierre RN;
for sedation; Verbal order read back and verified.
10:15:46 Fentanyl 50 mcg I.V. was administered by Rashaun Pierre
RN; for sedation; Verbal order read back and verified.
10:18:35 Procedure started.
10:19:34 Local anesthetic to right femoral artery with
Lidocaine 2% by Henry Perez MD.**INITIAL ACCESS
ONLY**
10:19:44 A 5 Fr sheath was inserted into the Right Femoral
artery
10:19:54 A MULTIPACK JL 4.0 5Fr catheter was advanced over the
wire and used for Left Coronary Angiography.
10:20:07 Baseline sample Acquired.
10:20:36 LCA angiography performed.
10:20:39 Injector settings: Ml/sec: 3, Volume: 6,
10:22:34 Catheter removed.
10:22:39 A MULTIPACK 3DRC 5Fr catheter was advanced over the
wire and used for Right Coronary Angiography.
10:23:48 RCA angiography performed.
10::51 Injector settings: Ml/sec: 3, Volume: 6,
10:24:24 Catheter removed.
10:24:32 A MULTIPACK Pigtail 5 Fr catheter was advanced over
the wire and used for Right Coronary Angiography.
10:25:15 Heparin Bolus 4000 units I.V. was administered by
Rashaun Pierre RN; for anticoagulation; verified with dr mccann Verbal order read back and verified.
10::23 LV hemodynamics recorded.
10::24 LV gram done using SANTOS
10::27 Injector settings: Ml/sec: 5, Volume: 15,
10:27:01 EF : 55 %
10:27:13 Catheter removed.
10:27:14 Proceeding to intervention.
10:27:40 SHEATH 6FR [place] (ZKV067) opened to sterile field.
10:27:40 INFLATOR Merit BasixCompak (FE8661) opened to sterile
field.
10:27:41 [place] Verrata Plus pressure wire (12046M) opened to
sterile field.
10:28:39 GUIDE 6FR JL 4.0 catheter ([id number]) opened to sterile
field.
10:29:04 Sheath upsized to a 6 Fr Short.
10:29:08 6 Fr JL 4 guide catheter was inserted over the wire
10:29:11 FFR/IFR wire advanced.
10:29:32 Integrilin (Bolus 2mg/ml) 5 ml I.V. was administered
by Rashaun Pierre RN; for antiplatelet therapy; wasted 5
ml of vial Verbal order read back and verified.
10:31:11 Baseline FFR 1.
10:31:15 Wire advanced across lesion.
10:33:31 mLAD lesion measured at 0.79 with IFR
10:37:21 Place stent Inflation Number: 1 A INTEGRITY RX 3.5 x
15 stent ([id number]) was prepped and advanced across
the Prox LAD 80. The stent was deployed at 14 CIPRIANO for
0:30 (min:sec) 0.
10:38:28 Stent catheter was removed intact over wire.
10:38:34 Wire removed.
10:38:34 Guide catheter removed.
10:39:06 A MULTIPACK 3DRC 5Fr catheter was advanced over the
wire and used for Right Coronary Angiography.
10:39:43 Bilateral renal angiography performed.
 
10:41:31 Catheter removed.
10:41:46 EXOSEAL 6Fr () opened to sterile field.
10:42:31 Sheath removed intact; hemostasis achieved with
Exoseal to the Right Femoral artery.
10:42:33 Procedure ended.(Physican Out)
10:42:44 Fluoroscopy time 05.60 minutes.
10:42:49 Flurop Dose total: 646
10:42:49 Fluoroscopy dose: 646 mGy
10:42:56 Dose Area Product 06135 mGy/cm.
10:43:00 Plavix 600 mg P.O. was administered by Rashaun Pierre RN;
for antiplatelet therapy; Verbal order read back and
verified.
10:43:00 Contrast amount:Isovue 300 149ml.
10:43:02 Maximum allowable dose exceeded? No.
10:43:03 Sharps counted by scrub and verified by R.N.
10:43:05 Insertion/operative site no bleeding no hematoma.
10:43:08 Post-op/insertion site Right Femoral artery dressed
using a 4 x 4 and Tegaderm.
10:43:09 Post Procedure Pulses reassessed and unchanged
10:43:12 Post procedure rhythm: unchanged.
10:43:15 Estimated blood loss: 5 ml
10:43:16 Post procedure instruction explained to
patient.Patient verbalizes understanding.
10:43:17 Patient needs reinforcement of post procedure
teaching.
10:43:51 Procedure type changed to Cath procedure, Diagnostic
procedure, C, [facility] w/Coronaries, Sedation Charges,
Moderate Sedation up to 30 minutes, PCI procedure,
Coronary Stent, Coronary Stent Initial, Hemochron ACT
Test, Peripheral Cath Diagnostic Procedure, Cath Lab
Peripheral Procedures, Renal Arteriogram
10:43:52 Procedure and supply charges have been captured,
reviewed, submitted and are correct.
10:43:58 Procedure Complication : No complications
10:44:02 Vital chart was stopped
10:44:06 [facility] Findings: MVD- PCI performed (see procedure note)
10:44:08 Operative report dictated upon procedure completion.
10:44:09 See physician's report for complete and final results.
10:44:13 Report given to [facility].
10:44:16 Patient transfered to [facility] with Stretcher.
10:44:19 Procedure ended.
10:44:19 Full Disclosure recording stopped
10:44:28 ACT drawn and resulted at 275 seconds. (normal
therapeutic range 180-240 seconds).
10:44:28 **ACC-PCI Only** Patient was given prescriptions, or
instructed by Henry Perez MD to start/continue the
following medications upon discharge: Plavix
10:44:29 End room use (Document Last)
10:47:51 End room use (Document Last)
10:48:14 End room use (Document Last)
Intervention Summary
Intervention Notes
Time     ActionType  Lesion and  Equipment    Action#  Pressure  Duration
Attributes  Used
10:37:21 Place stent Prox LAD    INTEGRITY RX 1        14        00:30
3.5 x 15
stent
([id number])
Device Usage
Item Name    Manufacture  Quantity  Catalog     Hospital Part       Current Mini
 
mal Lot# /
Number      Charge   Number     Stock   Stock   Serial#
Code
IV Extension Hospira      1             680430   77321      817832  5
Set
ACIST        Acist        1         67887       046025   213084     656742  20
Syringe      Medical
(59264)      Systems Inc
Bag Decanter Microtek     1         S       744161   70248      263714  5
(S)      Medical Inc.
Medline Cath Medline      1         [id number]   238045   54252      305187  5
Pack
([id number])
ACIST Hand   Acist        1         87684       577510   948716     311627  5
Control      Medical
(09871)      Systems Inc
ACIST        Acist        1         57782       892727   259036     995001  5
Manifold     Medical
(25383)      Systems Inc
DIAGNOSTIC   Cardinal     1         ZY9955      591212   13659      622509  30
Multipack    Health
5Fr catheter
set (MW2207)
Tegaderm 4 x 3M           1         1626W       763549   896969     962312  5
4 (1626W)
SHEATH 5FR   Terumo       1         CHL522      548910   000651     883774  5
[place]
(OKJ800)
EMERALD      Cardinal     1         502-455     451952   940298     276852  5
Guide Wire   Health
(502-455)
MULTIPACK JL Cardinal     1                                         936454  5
4.0 5Fr      Health
catheter
MULTIPACK    Cardinal     1                                         411936  5
3DRC 5Fr     Health
catheter
MULTIPACK    Cardinal     1                                         119522  5
Pigtail 5 Fr Health
catheter
SHEATH 6FR   Terumo       1         LIV575      108922   937550     641003  40
[place]
(TRM466)
INFLATOR     Merit        1         EI4860      494230   181567     211479  15
[facility]
BasixCompak
(UI4946)
Volcano      [place]      1         80632Z      076056   374586990  861057  5
Verrata Plus
pressure
wire
(50559R)
GUIDE 6FR JL Medtronic    1         EO4YJ70     833974   36600      233808  1
4.0 catheter
(BU1RB82)
INTEGRITY RX Medtronic    1         [id number]  525620   140670     570603  5   
    [id number]
3.5 x 15
stent
([id number])
 
EXOSEAL 6Fr  Cardinal     1                862035   689632     966736  10
()      Health
Signature Audit Destin
Stage           Time        Signature      Unsigned
Intra-Procedure 2020   hSayy Valle
10:47:51 AM RT(R)
Intra-Procedure 2020   Rashaun Pierre RN
10:48:14 AM
Intra-Procedure 2020   Henry Olson
10:48:52 AM Ori REYES
 
 
 
 
 
 
 
 
 
 
 
 
 
 
 
 
 
 
 
 
 
 
 
 
 
 
 
 
 
 
 
 
 
 
 
 
 
 
 
 
 
 
 
 
 
[facility]                                          
[address] [place] [address]

## 2020-04-19 VITALS — SYSTOLIC BLOOD PRESSURE: 141 MMHG | DIASTOLIC BLOOD PRESSURE: 71 MMHG

## 2020-04-19 VITALS — SYSTOLIC BLOOD PRESSURE: 138 MMHG | DIASTOLIC BLOOD PRESSURE: 82 MMHG

## 2020-04-19 VITALS — DIASTOLIC BLOOD PRESSURE: 60 MMHG | SYSTOLIC BLOOD PRESSURE: 121 MMHG

## 2020-04-19 VITALS — DIASTOLIC BLOOD PRESSURE: 63 MMHG | SYSTOLIC BLOOD PRESSURE: 141 MMHG

## 2020-04-19 LAB
APTT BLD: 31.3 SECONDS (ref 22.8–39.4)
BILIRUB SERPL-MCNC: NEGATIVE MG/DL
GLUCOSE SERPL-MCNC: NEGATIVE MG/DL
INR PPP: 1.04 (ref 0.85–1.17)
KETONES UR STRIP-MCNC: NEGATIVE MG/DL
NITRITE UR-MCNC: NEGATIVE MG/ML
PH UR STRIP: 6 [PH] (ref 5–6)
PROTHROMBIN TIME: 13.6 SECONDS (ref 11.6–15)
SP GR UR STRIP: 1.01 (ref 1–1.02)
UROBILINOGEN UR-MCNC: NORMAL MG/DL

## 2020-04-19 NOTE — NUR
PT SITTING UP ON SIDE OF BED. STATES HE IS HUNGRY. REASSURED THAT BREAKFAST
WILL BE BY SOON. VERBALIZED UNDERSTANDING. RR EVEN AND UNLABORED. DENIES NEEDS
OR PAIN AT THIS TIME. CALL LIGHT WITHIN REACH. BED IN LOWEST POSITION. WILL
CONTINUE TO MONITOR.

## 2020-04-19 NOTE — NUR
INITIAL ROUNDS COMPLETED AT 1910 HRS.  PT DENIED ANY DISCOMFORT.  ASESSMENT
COMPLETED AT 2010 HRS.  VSS.  ALERT AND ORIENTED TO PERSON, PLACE AND TIME.
BLACK.  SB PER CM HR 55.  PT [place].  IV TO L HAND SL.  LUNGS DIMINISHED IN BASES
BILAT.  PM MEDS GIVEN PER ORDERS.  RATIONALE FOR NPO AFTER MIDNIGTH EXPLAINED
TO PT.  PT STATED UNDERSTANDING. PT CURERTNLY RESTING WITH EYES CLOSED.  RESP
EVEN AND REGULAR.  SR UP X1, CALL LIGHT WITHIN REACH.

## 2020-04-20 VITALS — DIASTOLIC BLOOD PRESSURE: 76 MMHG | SYSTOLIC BLOOD PRESSURE: 154 MMHG

## 2020-04-20 VITALS — SYSTOLIC BLOOD PRESSURE: 170 MMHG | DIASTOLIC BLOOD PRESSURE: 79 MMHG

## 2020-04-20 VITALS — SYSTOLIC BLOOD PRESSURE: 178 MMHG | DIASTOLIC BLOOD PRESSURE: 83 MMHG

## 2020-04-20 VITALS — DIASTOLIC BLOOD PRESSURE: 75 MMHG | SYSTOLIC BLOOD PRESSURE: 135 MMHG

## 2020-04-20 VITALS — SYSTOLIC BLOOD PRESSURE: 146 MMHG | DIASTOLIC BLOOD PRESSURE: 83 MMHG

## 2020-04-20 VITALS — DIASTOLIC BLOOD PRESSURE: 85 MMHG | SYSTOLIC BLOOD PRESSURE: 166 MMHG

## 2020-04-20 LAB
ALT SERPL-CCNC: 24 U/L (ref 10–68)
ANION GAP SERPL CALC-SCNC: 9.9 MMOL/L (ref 8–16)
BASOPHILS NFR BLD AUTO: 0.3 % (ref 0–2)
BUN SERPL-MCNC: 24 MG/DL (ref 7–18)
CALCIUM SERPL-MCNC: 8.9 MG/DL (ref 8.5–10.1)
CHLORIDE SERPL-SCNC: 103 MMOL/L (ref 98–107)
CHOLEST/HDLC SERPL: 2.7 RATIO (ref 2.3–4.9)
CO2 SERPL-SCNC: 33.2 MMOL/L (ref 21–32)
CREAT SERPL-MCNC: 1 MG/DL (ref 0.6–1.3)
EOSINOPHIL NFR BLD: 1.5 % (ref 0–7)
ERYTHROCYTE [DISTWIDTH] IN BLOOD BY AUTOMATED COUNT: 14.7 % (ref 11.5–14.5)
GLUCOSE SERPL-MCNC: 97 MG/DL (ref 74–106)
HCT VFR BLD CALC: 44.8 % (ref 42–54)
HDLC SERPL-MCNC: 98 MG/DL (ref 32–96)
HGB BLD-MCNC: 14.7 G/DL (ref 13.5–17.5)
IMM GRANULOCYTES NFR BLD: 0.1 % (ref 0–5)
LDL-HDL RATIO: 1.6 RATIO (ref 1.5–3.5)
LDLC SERPL-MCNC: 157 MG/DL (ref 0–100)
LYMPHOCYTES NFR BLD AUTO: 35 % (ref 15–50)
MAGNESIUM SERPL-MCNC: 2.3 MG/DL (ref 1.8–2.4)
MCH RBC QN AUTO: 31.5 PG (ref 26–34)
MCHC RBC AUTO-ENTMCNC: 32.8 G/DL (ref 31–37)
MCV RBC: 95.9 FL (ref 80–100)
MONOCYTES NFR BLD: 11.2 % (ref 2–11)
NEUTROPHILS NFR BLD AUTO: 51.9 % (ref 40–80)
OSMOLALITY SERPL CALC.SUM OF ELEC: 286 MOSM/KG (ref 275–300)
PLATELET # BLD: 176 10X3/UL (ref 130–400)
PMV BLD AUTO: 11.7 FL (ref 7.4–10.4)
POTASSIUM SERPL-SCNC: 4.1 MMOL/L (ref 3.5–5.1)
RBC # BLD AUTO: 4.67 10X6/UL (ref 4.2–6.1)
SODIUM SERPL-SCNC: 142 MMOL/L (ref 136–145)
TRIGL SERPL-MCNC: 30 MG/DL (ref 30–200)
WBC # BLD AUTO: 7.2 10X3/UL (ref 4.8–10.8)

## 2020-04-20 PROCEDURE — 04793ZZ DILATION OF RIGHT RENAL ARTERY, PERCUTANEOUS APPROACH: ICD-10-PCS | Performed by: INTERNAL MEDICINE

## 2020-04-20 PROCEDURE — B2111ZZ FLUOROSCOPY OF MULTIPLE CORONARY ARTERIES USING LOW OSMOLAR CONTRAST: ICD-10-PCS | Performed by: INTERNAL MEDICINE

## 2020-04-20 PROCEDURE — 047A3ZZ DILATION OF LEFT RENAL ARTERY, PERCUTANEOUS APPROACH: ICD-10-PCS | Performed by: INTERNAL MEDICINE

## 2020-04-20 PROCEDURE — B2151ZZ FLUOROSCOPY OF LEFT HEART USING LOW OSMOLAR CONTRAST: ICD-10-PCS | Performed by: INTERNAL MEDICINE

## 2020-04-20 PROCEDURE — 4A023N7 MEASUREMENT OF CARDIAC SAMPLING AND PRESSURE, LEFT HEART, PERCUTANEOUS APPROACH: ICD-10-PCS | Performed by: INTERNAL MEDICINE

## 2020-04-20 PROCEDURE — 02703DZ DILATION OF CORONARY ARTERY, ONE ARTERY WITH INTRALUMINAL DEVICE, PERCUTANEOUS APPROACH: ICD-10-PCS | Performed by: INTERNAL MEDICINE

## 2020-04-20 NOTE — NUR
ARRIVE BACK TO ROOM VIA BED FROM CATH LAB. SEDATED. AROUSES TO STIMULI. VITALS
STABLE. SINUS RYTHM ON TELEMETRY. RT GROIN DRESSING C/D/I. FREE FROM BLEEDING.
FREE FROM HEMATOMA. PULSE +2 BILATERALLY. CONTINUE PLAN OF CARE AND SAFETY
PRECAUTIONS.

## 2020-04-20 NOTE — CN
PATIENT NAME:KARLEE CROCKER                               MEDICAL RECORD: [id number]
: 35                                              LOCATION:[facility] D.2123
ADMIT DATE: 20                                       ACCOUNT: [id number]
CONSULTING PHYSICIAN:    IMLY MEDINA MD          
                                               
REFERRING PHYSICIAN:     DENIZ POLLARD MD          
 
 
DATE OF CONSULTATION:  2020
 
HISTORY OF PRESENT ILLNESS:  An 84-year-old gentleman with a history of
hypertension, it has been more difficult to control as of late, actually he was
seen in the office with near syncope and hypertension.  Event monitor was
consistent with sick sinus syndrome in straight bradyarrhythmias.  Additionally,
he has been having intermittent chest tightness and pressure associated with the
hypertension with possible demand ischemia.  He has had more difficulty with
hypertension as of late, despite 4 medical therapy including a vasodilator,
clonidine, and diuretic.  He is admitted for further evaluation.
 
PAST MEDICAL HISTORY:  Includes:
1.  History of hypertension.
2.  Hyperlipidemia.
3.  History of syncope.
 
MEDICATIONS:  Included isosorbide 40 b.i.d., amlodipine 10 every day, clonidine
0.2 b.i.d., HCTZ 37.5/25 every day.
 
ALLERGIES:  CLARITIN, HYDRALAZINE, DILTIAZEM, AND AZITHROMYCIN.
 
SOCIAL HISTORY:  Nonsmoker, nondrinker.  Takes care of his ADLs.  No set
exercise program.
 
REVIEW OF SYSTEMS:  The patient reports easy bruising but reports no swollen
glands. The patient reports no fever, no night sweats, no significant weight
gain, no significant weight loss.  No significant exercise tolerance.  The
patient reports no dry eyes, no irritation, no vision change.  Patient reports
no difficulty hearing and no ear pain.  Patient reports no frequent nose bleeds
or nose and sinus problems.  Patient reports on arm pain on exertion.   No
shortness of breath while lying down.  No history of heart murmur.  Patient
reports no cough, no wheezing or coughing up blood.  Patient reports no
abdominal pain, no vomiting.  Normal appetite.  No diarrhea and not vomiting
blood.  No nausea and no constipation.  Patient reports no incontinence.  No
difficulty urinating.  No hematuria.  No increased frequency.  Patient reports
no muscle aches.  No weakness, no arthralgias, no back pain.  No swelling of the
extremities.  Patient reports no abnormal mole, no jaundice, no rashes.  Reports
no loss of consciousness.  No weakness and no numbness.  No seizures, dizziness,
or headaches.  The patient reports no depression, no sleep disturbance, feeling
safe in a relationship and no alcohol abuse.  Patient reports on fatigue. 
Reports no runny nose or sinus pressure.  No itching, no hives, and no frequent
sneezing.
 
PHYSICAL EXAMINATION:
GENERAL:  Pleasant gentleman, in no acute distress, appears stated age.
VITAL SIGNS:  Blood pressure 142/85, pulse 48.
HEENT:  Normocephalic, atraumatic.
NECK:  No bruits are noted.
HEART:  Regular, II/VI systolic ejection murmur.
LUNGS:  Good air excursion.
 
 
 
CONSULT REPORT                                 [id number]    CROCKERKARLEE L             
 
 
ABDOMEN:  Soft, nontender.
EXTREMITIES:  Pulses are decreased, 1+.  There is no edema.
 
DIAGNOSTIC DATA:  EKG shows sinus shawnee.  Secondary ST-T changes, poor R-wave
progression, left anterior fascicular block.
 
IMPRESSION:  Given his continued anginal symptomology, multiple risk factors,
abnormal ECG, we will plan for diagnostic angiography.  Certainly, he meets
criteria for pacing on his 30 day event monitor.  Additionally, given 4
medications, still not adequate blood pressure control.  Consider renal
arteriography.
 
TRANSINT:[id number] Voice Confirmation ID: [id number] DOCUMENT ID: [id number]
                                           
                                           MILY MEDINA MD          
 
 
 
Electronically Signed by MILY MEDINA on 20 at 0957
 
 
 
 
 
 
 
 
 
 
 
 
 
 
 
 
 
 
 
 
 
 
 
 
 
 
 
 
CC:                                                             [id number]
DICTATION DATE: 20 0952     :     20 1303      ADM IN  
                                                                              
[facility]                                          
[address] [place] [address]

## 2020-04-20 NOTE — NUR
RECIEVE REPORT. RESTING IN BED WITH EYES CLOSED. RESPIRATIONS EVEN AND
REGULAR. NO SIGNS OF DISTRESS. CONTINUE PLAN OF CARE AND SAFETY PRECAUTIONS.

## 2020-04-20 NOTE — NUR
PT HAS C/O HA.  STATES BP HIGH.  /84. TYLENOL 650 MG PO AND AM NORVASC
GIVEN WITH SMALL SIPS OF WATER.  WILL RECHECK BP IN HOUR.  PT STATES
UNDERSTANDING.

## 2020-04-20 NOTE — NUR
ALERT AND ORIENTED X4. SITTING UP IN BED. CONSENTS FOR PACEMAKER PLACEMENT
SIGNED ON CHART. DENIES ANY NEEDS. CONTINUE PLAN OF CARE AND SAFETY
PRECAUTIONS.

## 2020-04-20 NOTE — NUR
HIBICLENS SHOWER IN PROCESS PER SELF.  NPO SINCE MIDNIGHT.  FOR ?LHC TODAY.
NEEDS MET; WILL CONTINUE TO MONITOR.

## 2020-04-21 VITALS — SYSTOLIC BLOOD PRESSURE: 144 MMHG | DIASTOLIC BLOOD PRESSURE: 79 MMHG

## 2020-04-21 VITALS — DIASTOLIC BLOOD PRESSURE: 92 MMHG | SYSTOLIC BLOOD PRESSURE: 164 MMHG

## 2020-04-21 VITALS — DIASTOLIC BLOOD PRESSURE: 77 MMHG | SYSTOLIC BLOOD PRESSURE: 148 MMHG

## 2020-04-21 VITALS — DIASTOLIC BLOOD PRESSURE: 70 MMHG | SYSTOLIC BLOOD PRESSURE: 125 MMHG

## 2020-04-21 VITALS — SYSTOLIC BLOOD PRESSURE: 157 MMHG | DIASTOLIC BLOOD PRESSURE: 78 MMHG

## 2020-04-21 LAB
ANION GAP SERPL CALC-SCNC: 10.8 MMOL/L (ref 8–16)
ANION GAP SERPL CALC-SCNC: 10.8 MMOL/L (ref 8–16)
APTT BLD: 32.5 SECONDS (ref 22.8–39.4)
APTT BLD: 34.4 SECONDS (ref 22.8–39.4)
BASOPHILS NFR BLD AUTO: 0.3 % (ref 0–2)
BUN SERPL-MCNC: 22 MG/DL (ref 7–18)
BUN SERPL-MCNC: 24 MG/DL (ref 7–18)
CALCIUM SERPL-MCNC: 8.6 MG/DL (ref 8.5–10.1)
CALCIUM SERPL-MCNC: 8.9 MG/DL (ref 8.5–10.1)
CHLORIDE SERPL-SCNC: 102 MMOL/L (ref 98–107)
CHLORIDE SERPL-SCNC: 103 MMOL/L (ref 98–107)
CO2 SERPL-SCNC: 29.8 MMOL/L (ref 21–32)
CO2 SERPL-SCNC: 30 MMOL/L (ref 21–32)
CREAT SERPL-MCNC: 0.8 MG/DL (ref 0.6–1.3)
CREAT SERPL-MCNC: 0.9 MG/DL (ref 0.6–1.3)
EOSINOPHIL NFR BLD: 1.4 % (ref 0–7)
ERYTHROCYTE [DISTWIDTH] IN BLOOD BY AUTOMATED COUNT: 14.7 % (ref 11.5–14.5)
ERYTHROCYTE [DISTWIDTH] IN BLOOD BY AUTOMATED COUNT: 14.7 % (ref 11.5–14.5)
GLUCOSE SERPL-MCNC: 106 MG/DL (ref 74–106)
GLUCOSE SERPL-MCNC: 106 MG/DL (ref 74–106)
HCT VFR BLD CALC: 45.3 % (ref 42–54)
HCT VFR BLD CALC: 47.4 % (ref 42–54)
HGB BLD-MCNC: 14.9 G/DL (ref 13.5–17.5)
HGB BLD-MCNC: 15.7 G/DL (ref 13.5–17.5)
IMM GRANULOCYTES NFR BLD: 0.1 % (ref 0–5)
INR PPP: 1.02 (ref 0.85–1.17)
INR PPP: 1.02 (ref 0.85–1.17)
LYMPHOCYTES NFR BLD AUTO: 30.6 % (ref 15–50)
MAGNESIUM SERPL-MCNC: 2.3 MG/DL (ref 1.8–2.4)
MCH RBC QN AUTO: 31.4 PG (ref 26–34)
MCH RBC QN AUTO: 32 PG (ref 26–34)
MCHC RBC AUTO-ENTMCNC: 32.9 G/DL (ref 31–37)
MCHC RBC AUTO-ENTMCNC: 33.1 G/DL (ref 31–37)
MCV RBC: 95.6 FL (ref 80–100)
MCV RBC: 96.5 FL (ref 80–100)
MONOCYTES NFR BLD: 13.1 % (ref 2–11)
NEUTROPHILS NFR BLD AUTO: 54.5 % (ref 40–80)
OSMOLALITY SERPL CALC.SUM OF ELEC: 280 MOSM/KG (ref 275–300)
OSMOLALITY SERPL CALC.SUM OF ELEC: 282 MOSM/KG (ref 275–300)
PLATELET # BLD: 192 10X3/UL (ref 130–400)
PLATELET # BLD: 203 10X3/UL (ref 130–400)
PMV BLD AUTO: 11.3 FL (ref 7.4–10.4)
PMV BLD AUTO: 11.4 FL (ref 7.4–10.4)
POTASSIUM SERPL-SCNC: 3.6 MMOL/L (ref 3.5–5.1)
POTASSIUM SERPL-SCNC: 3.8 MMOL/L (ref 3.5–5.1)
PROTHROMBIN TIME: 13.3 SECONDS (ref 11.6–15)
PROTHROMBIN TIME: 13.4 SECONDS (ref 11.6–15)
RBC # BLD AUTO: 4.74 10X6/UL (ref 4.2–6.1)
RBC # BLD AUTO: 4.91 10X6/UL (ref 4.2–6.1)
SODIUM SERPL-SCNC: 139 MMOL/L (ref 136–145)
SODIUM SERPL-SCNC: 140 MMOL/L (ref 136–145)
WBC # BLD AUTO: 7.8 10X3/UL (ref 4.8–10.8)
WBC # BLD AUTO: 8.7 10X3/UL (ref 4.8–10.8)

## 2020-04-21 PROCEDURE — 02H63JZ INSERTION OF PACEMAKER LEAD INTO RIGHT ATRIUM, PERCUTANEOUS APPROACH: ICD-10-PCS | Performed by: INTERNAL MEDICINE

## 2020-04-21 PROCEDURE — 02HK3JZ INSERTION OF PACEMAKER LEAD INTO RIGHT VENTRICLE, PERCUTANEOUS APPROACH: ICD-10-PCS | Performed by: INTERNAL MEDICINE

## 2020-04-21 PROCEDURE — 0JH606Z INSERTION OF PACEMAKER, DUAL CHAMBER INTO CHEST SUBCUTANEOUS TISSUE AND FASCIA, OPEN APPROACH: ICD-10-PCS | Performed by: INTERNAL MEDICINE

## 2020-04-21 NOTE — OP
PATIENT NAME:  KARLEE CROCKER                           MEDICAL RECORD: [id number]
:35                                             LOCATION:D.     D.2123
                                                         ADMISSION DATE:20
SURGEON:  MILY MEDINA MD          
 
 
DATE OF OPERATION:  2020
 
PROCEDURE:  Left heart catheterization, selective coronary angiography, plus iFR
wire plus renal arteriography, right femoral artery approach.
 
CATHETERS:  A 5-Yakut sheath, 5/4 left and right Tho, 5/4 pig as well as a
JR4 guide and iFR wire.
 
Procedure was well tolerated and the patient returned to the smith after sheath
was removed.  ExoSeal device placed.
 
FINDINGS:  Left ventriculography in 30-degree SANTOS view:  Normal wall motion and
normal systolic function.
 
CORONARY ANATOMY:
LEFT MAIN:  Left main is free of disease.
LAD:  Has 70% to 80% stenosis, which initially angiographically appeared to be
questionable flow restrictive; however, iFR wire confirmed a significant
stenosis at 0.79.
CIRCUMFLEX:  Circumflex is free of disease.
RIGHT CORONARY ARTERY:  Has a 90% stenosis, a discrete takeoff of the RV branch.
 
Renal arteriogram was performed secondary to difficulty of hypertension despite
4 agent therapy.  Right renal artery:  Right renal artery, which was selectively
engaged, is smooth-walled vessel, free of disease.  Left renal artery: The left
renal artery selectively engaged, smooth-walled vessel, free of disease.  Next,
with the iFR wire in place, we placed a 3.5 x 15 mm Integrity nondrug eluting
stent up to 14 atmospheres for 45 seconds.  This showed excellent resolution of
80% stenosis, no significant residual.  FERNANDO flow was 3 throughout the
procedure.  Heparin was used during the case.  Sheath was closed with ExoSeal
device.  Plavix loaded in the lab.
 
TRANSINT:[id number] Voice Confirmation ID: [id number] DOCUMENT ID: [id number]
                                           
                                           MILY MEDINA MD          
 
 
 
Electronically Signed by MILY MEDINA on 20 at 1309
 
 
 
 
 
CC:                                                             [id number]
DICTATION DATE: 20 1058     :     20 1331      ADM IN  
                                                                              
[place] [id number] [place] [id number]

## 2020-04-21 NOTE — OP
PATIENT NAME:  ALLEN CROCKER                           MEDICAL RECORD: [id number]
:35                                             LOCATION:D.M2     D.2123
                                                         ADMISSION DATE:20
SURGEON:  MILY MEDINA MD          
 
 
DATE OF OPERATION:  2020
 
PROCEDURE:  Lead portion of permanent pacemaker placement.
 
INDICATION:  Sick sinus syndrome with pauses and shawnee escape rhythms.
 
SURGEON:  Manny Vallecillo MD
 
DESCRIPTION OF PROCEDURE:  After left subclavian was cannulated via modified
Seldinger technique via Dr. Vallecillo first under fluoroscopic guidance, I placed
the RV lead in the RV apex without any difficulty.  After adequate thresholds
and R waves were obtained, again under fluoroscopic guidance, I placed the right
atrial lead in the right atrial appendage without any difficulty.  After
adequate P waves and thresholds were obtained, leads were attached to
appropriate poles of the generator and the pocket was closed via Dr. Vallecillo.
 
IMPRESSION:  Successful lead portion of permanent pacemaker placement of Allen Crocker.
 
ESTIMATED BLOOD LOSS:  Minimal.
 
COMPLICATIONS:  None.
 
DISPOSITION:  To the floor, stable.
 
TRANSINT:[id number] Voice Confirmation ID: [id number] DOCUMENT ID: [id number]
                                           
                                           MILY MEDINA MD          
 
 
 
Electronically Signed by MILY MEDINA on 20 at 1610
 
 
 
 
 
 
 
 
 
 
 
 
CC:                                                             [id number]
DICTATION DATE: 20 1439     :     20 1544      ADM IN  
                                                                              
[place] [id number] [place] [id number]

## 2020-04-21 NOTE — EC
PATIENT:KARLEE CROCKER                 DATE OF SERVICE: 04/19/20
SEX: M                                  MEDICAL RECORD: [id number]
DATE OF BIRTH: [birth year]                        LOCATION:D.M2      D.212
AGE OF PATIENT: 84                             ADMISSION DATE: 04/19/20
 
REFERRING PHYSICIAN:                               
 
INTERPRETING PHYSICIAN: MILY EMDINA MD          
 
 
 
                             ECHOCARDIOGRAM REPORT
  ECHO CHARGES 4               ECHO COMPLETE                 Date: 04/20/20
 
 
 
CLINICAL DIAGNOSIS: SYNCOPE                       
 
                         ECHOCARDIOGRAPHIC MEASUREMENTS
      (adult normal given)
   AC root (d.<3.7cm) 3.9  cm   LV Septum d (<1.2 cm> 1.3  cm
      Valve Excursion 1.9  cm     LV Septum (systole) 1.7  cm
Left Atria (s.<4.0cm> 2.7  cm          LVPW d(<1.2cm) 1.3  cm
        RV (d.<2.3cm) 1.6  cm           LVPW (sytole) 1.7  cm
  LV diastole(<5.6CM) 4.2  cm       MV E-F(>70mm/sec)      cm
           LV systole 3.1  cm           LVOT Diameter 1.9  cm
       MV exc.(>10mm)      cm
Est.ejection fraction (50-75%)     %
 
   DOPPLER:
     LVIT      cm/sec A 93.0 cm/sec E 66.0  cm/sec
       LA      cm/sec      RVSP 19.3 mmHg
     LVOT 114  cm/sec   AOP1/2T      m/s
  Asc. Ao 147  cm/sec
     RVOT 75.0 cm/sec
       RA      cm/sec
       PA 62.0 cm/sec
 AV Gradient Peak 8.7  mmHg  AV Mean 5.2  mmHg  AV Area 1.9  cm
 MV Gradient Peak 4.2  mmHg  MV Mean 1.2  mmHg  MV Area      cm
   COMMENTS:                                              
 
 
 Cardiac Sonographer: 1               LYN ARMIJOOE            
      Cardiologist: 3          Dr. Garcia             
             TAPE# PACS           
                                       Pericardial Effusion N                        
 
 
DATE OF SERVICE:  
 
Adequate 2D, color flow imaging, spectral Doppler, and M-Mode.
 
LVH is present.  LV internal dimensions are normal.  Wall motion is normal.  EF
is greater than or equal to 55%.  Aortic valve is tricuspid.  No evidence of
stenosis by Doppler interrogation.  Left atrium normal at 2.7 cm.  Mitral valve
shows no prolapse.  Trace MR.  Right-sided chambers are grossly normal.  Trace
TR.
 
 
 
 
ECHOCARDIOGRAM REPORT                          [id number]    KARLEE CROCKER         
 
 
TRANSINT:[id number] Voice Confirmation ID: [id number] DOCUMENT ID: [id number]
                                           
                                           MILY MEDINA MD          
 
 
 
Electronically Signed by MILY MEDINA on 04/21/20 at 1309
 
 
 
 
 
 
 
 
 
 
 
 
 
 
 
 
 
 
 
 
 
 
 
 
 
 
 
 
 
 
 
 
 
 
 
 
 
 
 
CC:                                                             [id number]
DICTATION DATE: 04/20/20 1112     :     04/20/20 1154      ADM IN  
                                                                              
[facility]                                          
[address] [place] [id number]

## 2020-04-22 VITALS — SYSTOLIC BLOOD PRESSURE: 135 MMHG | DIASTOLIC BLOOD PRESSURE: 80 MMHG

## 2020-04-22 VITALS — SYSTOLIC BLOOD PRESSURE: 135 MMHG | DIASTOLIC BLOOD PRESSURE: 83 MMHG

## 2020-04-22 VITALS — SYSTOLIC BLOOD PRESSURE: 135 MMHG | DIASTOLIC BLOOD PRESSURE: 75 MMHG

## 2020-04-22 LAB
ANION GAP SERPL CALC-SCNC: 11.7 MMOL/L (ref 8–16)
BASOPHILS NFR BLD AUTO: 0.1 % (ref 0–2)
BUN SERPL-MCNC: 28 MG/DL (ref 7–18)
CALCIUM SERPL-MCNC: 8.8 MG/DL (ref 8.5–10.1)
CHLORIDE SERPL-SCNC: 100 MMOL/L (ref 98–107)
CO2 SERPL-SCNC: 29.1 MMOL/L (ref 21–32)
CREAT SERPL-MCNC: 1 MG/DL (ref 0.6–1.3)
EOSINOPHIL NFR BLD: 1.3 % (ref 0–7)
ERYTHROCYTE [DISTWIDTH] IN BLOOD BY AUTOMATED COUNT: 14.7 % (ref 11.5–14.5)
GLUCOSE SERPL-MCNC: 107 MG/DL (ref 74–106)
HCT VFR BLD CALC: 44.7 % (ref 42–54)
HGB BLD-MCNC: 14.8 G/DL (ref 13.5–17.5)
IMM GRANULOCYTES NFR BLD: 0.3 % (ref 0–5)
LYMPHOCYTES NFR BLD AUTO: 26.2 % (ref 15–50)
MAGNESIUM SERPL-MCNC: 2.2 MG/DL (ref 1.8–2.4)
MCH RBC QN AUTO: 31.6 PG (ref 26–34)
MCHC RBC AUTO-ENTMCNC: 33.1 G/DL (ref 31–37)
MCV RBC: 95.3 FL (ref 80–100)
MONOCYTES NFR BLD: 15.1 % (ref 2–11)
NEUTROPHILS NFR BLD AUTO: 57 % (ref 40–80)
OSMOLALITY SERPL CALC.SUM OF ELEC: 279 MOSM/KG (ref 275–300)
PLATELET # BLD: 184 10X3/UL (ref 130–400)
PMV BLD AUTO: 11.5 FL (ref 7.4–10.4)
POTASSIUM SERPL-SCNC: 3.8 MMOL/L (ref 3.5–5.1)
RBC # BLD AUTO: 4.69 10X6/UL (ref 4.2–6.1)
SODIUM SERPL-SCNC: 137 MMOL/L (ref 136–145)
WBC # BLD AUTO: 7.7 10X3/UL (ref 4.8–10.8)

## 2020-04-22 NOTE — MORECARE
CASE MANAGEMENT DISCHARGE SUMMARY
 
 
PATIENT: KARLEE CROCKER                     UNIT: [id number]
ACCOUNT#: [id number]                       ADM DATE: 20
AGE: 84     : 35  SEX: M            ROOM/BED: D.8707    
AUTHOR: MEGHAN,DOC                             PHYSICIAN:                               
 
REFERRING PHYSICIAN: DENIZ POLLARD MD          
DATE OF SERVICE: 20
Discharge Plan
 
 
Patient Name: KARLEE CROCKER
Facility: [facility]:[place]
Encounter #: [id number]
Medical Record #: [id number]
: 1935
Planned Disposition: Home
Anticipated Discharge Date: 
 
Discharge Date: 
Expected LOS: 
Initial Reviewer: [email or web address]
Initial Review Date: 2020
Generated: 20  10:49 am 
Comments
 
DCP- Discharge Planning
 
Updated by [id number]: Keya Wadsworth on 20   8:46 am CT
Patient Name: KARLEE CROCKER                                     
Admission Status: ER   
Accout number: [id number]                              
Admission Date: 2020   
: 1935                                                        
Admission Diagnosis:HYPERTENSIVE EMERGENCY   
Attending: LATRICE,                                                
Current LOS:  3   
  
Anticipated DC Date:    
Planned Disposition: Home   
Primary Insurance: MEDICARE A & B   
  
CM met with patient to complete initial dc planning assessment.  CM educated 
patient on the CM role and verbal consent given by patient to complete 
assessment.   Patient lives at home alone.  At discharge patient plans to 
return  and feels this is a safe discharge.  CM discussed availability of 
home health, rehab services, and medical equipment. Patient denied known 
discharge needs at this time. He states his daughter lives across the street 
 
and checks on him daily. States his daughter will drive him home. He declines 
home health at this time. CM will continue to follow and will assist as 
needed with dc plans/needs.    
Discharge Planning Comments:   
  
  
  
  
  
  
: Keya Wadsworth
 DCPIA - Discharge Planning Initial Assessment
 
Updated by [email or web address]: Keya Wadsworth on 20   9:45 am
*  Is the patient Alert and Oriented?
Yes
*  How many steps to enter\exit or inside your home? 4/0 *  PCP Dr. Kennedy *  Pharmacy
[facility] on [address]
*  Preadmission Environment
Home Alone
*  ADLs
Independent
*  Equipment
None
*  List name and contact numbers for known caregivers / representatives who 
currently or will assist patient after discharge:
Miniashley young daughter - [phone]
*  Verbal permission to speak to the caregivers and representatives has been 
obtained from the patient.
Yes
*  Community resources currently utilized
None
*  Additional services required to return to the preadmission environment?
No
*  Can the patient safely return to the preadmission environment?
Yes
*  Has this patient been hospitalized within the prior 30 days at any 
hospital?
No
 
 
 
 
 
Coverage Notice
 
Reviewer: [email or web address] Geovanna Wadsworth
 
Notice Issued Date-Time: 2020   9:47
Notice Type: IM Discharge Notice
 
Notice Delivered To: Patient
 
Relationship to Patient: Self
Representative Name: 
 
Delivery Method: HAND - Hand Delivered
Carolina Days:
Prior Verbal Notification: 
 
Recipient Understood Notice: Yes
Recipient Signature: Yes
Med Rec Note Co-signed by Attending:
 
Coverage Notice Comment:  IMM explained, signed, given, copy placed in MR
Patient Name: KARLEE CROCKER
Encounter #: [id number]
Page [id number]
 
 
 
 
 
Electronically Signed by AGATHA CHRISTIANSON on 20 at 0950
 
 
 
 
 
 
**All edits/amendments must be made on the electronic document**
 
DICTATION DATE: 20     : ANALIA  20     
RPT#: [id number]                                DC DATE:        
                                               STATUS: ADM IN  
[facility]
[address] [place] [address]
***END OF REPORT***

## 2020-04-22 NOTE — NUR
PATIENT IS STABLE AND VSS. PATIENT DENIES ANY NEEDS OR PAIN. ORDERS RECIEVED
FOR DC. WRITTEN AND VERBAL INSTRUCTIONS GIVEN TO PATIENT . PATIENT VERBALIZED
UNDERSTANDING AND SIGNED WRITTEN INSTRUCTIONS. COPY GIVEN TO PATIENT. NOW
WAITING ON RIDE. WILL CONTINUE TO MONITOR.

## 2020-04-22 NOTE — NUR
REPORT RECIEVED FROM NIGHT SHIFT ANDPATIENT CARE ASSUMED. PATIENT LAYING IN
BED ON BACK WITH EYES CLOSED AND BREATHING EVENLY. WILL CONTINUE WITH PLAN OF
CARE. SR UP X 2 BED IN LOW POSITION AND CALL LIGHT IN REACH.

## 2020-04-22 NOTE — MORECARE
CASE MANAGEMENT DISCHARGE SUMMARY
 
 
PATIENT: KARLEE CROCKER                     UNIT: [id number]
ACCOUNT#: [id number]                       ADM DATE: 20
AGE: 84     : 35  SEX: M            ROOM/BED: D.2123    
AUTHOR: MEGHAN,DOC                             PHYSICIAN:                               
 
REFERRING PHYSICIAN: DENIZ POLLARD MD          
DATE OF SERVICE: 20
Discharge Plan
 
 
Patient Name: KARLEE CROCKER
Facility: [facility]:[place]
Encounter #: [id number]
Medical Record #: [id number]
: 1935
Planned Disposition: Home
Anticipated Discharge Date: 
 
Discharge Date: 2020
Expected LOS: 0
Initial Reviewer: [email or web address]
Initial Review Date: 2020
Generated: 20   5:44 pm 
Comments
 
DCP- Discharge Planning
 
Updated by [id number]: Keya Wadsworth on 20   8:46 am CT
Patient Name: KARLEE CROCKER                                     
Admission Status: ER   
Accout number: [id number]                              
Admission Date: 2020   
: 1935                                                        
Admission Diagnosis:HYPERTENSIVE EMERGENCY   
Attending: LATRICE,                                                
Current LOS:  3   
  
Anticipated DC Date:    
Planned Disposition: Home   
Primary Insurance: MEDICARE A & B   
  
CM met with patient to complete initial dc planning assessment.  CM educated 
patient on the CM role and verbal consent given by patient to complete 
assessment.   Patient lives at home alone.  At discharge patient plans to 
return  and feels this is a safe discharge.  CM discussed availability of 
home health, rehab services, and medical equipment. Patient denied known 
discharge needs at this time. He states his daughter lives across the street 
 
and checks on him daily. States his daughter will drive him home. He declines 
home health at this time. CM will continue to follow and will assist as 
needed with dc plans/needs.    
Discharge Planning Comments:   
  
  
  
  
  
  
: Keya Wadsworth
 DCPIA - Discharge Planning Initial Assessment
 
Updated by [email or web address]: Keya Wadsworth on 20   9:45 am
*  Is the patient Alert and Oriented?
Yes
*  How many steps to enter\exit or inside your home? 4/0 *  PCP Dr. Kennedy *  Pharmacy
[facility] on [address]
*  Preadmission Environment
Home Alone
*  ADLs
Independent
*  Equipment
None
*  List name and contact numbers for known caregivers / representatives who 
currently or will assist patient after discharge:
M[employer] - [phone]
*  Verbal permission to speak to the caregivers and representatives has been 
obtained from the patient.
Yes
*  Community resources currently utilized
None
*  Additional services required to return to the preadmission environment?
No
*  Can the patient safely return to the preadmission environment?
Yes
*  Has this patient been hospitalized within the prior 30 days at any 
hospital?
No
 
 
 
 
 
Coverage Notice
 
Reviewer: [email or web address] Geovanna Wadsworth
 
Notice Issued Date-Time: 2020   9:47
Notice Type: IM Discharge Notice
 
Notice Delivered To: Patient
 
Relationship to Patient: Self
Representative Name: 
 
Delivery Method: HAND - Hand Delivered
Carol[place] Days:
Prior Verbal Notification: 
 
Recipient Understood Notice: Yes
Recipient Signature: Yes
Med Rec Note Co-signed by Attending:
 
Coverage Notice Comment:  IMM explained, signed, given, copy placed in MR
 
Last DP export: 20   8:50 a
Patient Name: KARLEE CROCKER
Encounter #: [id number]
Page [id number]
 
 
 
 
 
Electronically Signed by AGATHA CHRISTIANSON on 20 at 1644
 
 
 
 
 
 
**All edits/amendments must be made on the electronic document**
 
DICTATION DATE: 20     : ANALIA  20     
RPT#: [id number]                                DC DATE:20
                                               STATUS: DIS IN  
[facility]
[address] [place] [address]
***END OF REPORT***

## 2021-05-10 ENCOUNTER — HOSPITAL ENCOUNTER (EMERGENCY)
Dept: HOSPITAL 84 - D.ER | Age: 86
Discharge: HOME | End: 2021-05-10
Payer: MEDICARE

## 2021-05-10 VITALS — SYSTOLIC BLOOD PRESSURE: 139 MMHG | DIASTOLIC BLOOD PRESSURE: 75 MMHG

## 2021-05-10 VITALS
BODY MASS INDEX: 20.5 KG/M2 | WEIGHT: 135.28 LBS | WEIGHT: 135.28 LBS | HEIGHT: 68 IN | BODY MASS INDEX: 20.5 KG/M2 | HEIGHT: 68 IN

## 2021-05-10 DIAGNOSIS — I10: ICD-10-CM

## 2021-05-10 DIAGNOSIS — R51.9: Primary | ICD-10-CM

## 2021-05-25 ENCOUNTER — HOSPITAL ENCOUNTER (INPATIENT)
Dept: HOSPITAL 84 - D.ER | Age: 86
LOS: 2 days | Discharge: HOME | DRG: 247 | End: 2021-05-27
Attending: FAMILY MEDICINE | Admitting: FAMILY MEDICINE
Payer: MEDICARE

## 2021-05-25 VITALS — DIASTOLIC BLOOD PRESSURE: 83 MMHG | SYSTOLIC BLOOD PRESSURE: 125 MMHG

## 2021-05-25 VITALS — SYSTOLIC BLOOD PRESSURE: 125 MMHG | DIASTOLIC BLOOD PRESSURE: 83 MMHG

## 2021-05-25 VITALS — SYSTOLIC BLOOD PRESSURE: 118 MMHG | DIASTOLIC BLOOD PRESSURE: 82 MMHG

## 2021-05-25 VITALS
WEIGHT: 133.66 LBS | BODY MASS INDEX: 20.26 KG/M2 | BODY MASS INDEX: 20.26 KG/M2 | HEIGHT: 68 IN | BODY MASS INDEX: 20.26 KG/M2 | WEIGHT: 133.66 LBS | HEIGHT: 68 IN

## 2021-05-25 DIAGNOSIS — E78.5: ICD-10-CM

## 2021-05-25 DIAGNOSIS — I10: ICD-10-CM

## 2021-05-25 DIAGNOSIS — Z95.0: ICD-10-CM

## 2021-05-25 DIAGNOSIS — I25.10: ICD-10-CM

## 2021-05-25 DIAGNOSIS — I49.5: ICD-10-CM

## 2021-05-25 DIAGNOSIS — I24.9: Primary | ICD-10-CM

## 2021-05-25 LAB
ALBUMIN SERPL-MCNC: 3.6 G/DL (ref 3.4–5)
ALP SERPL-CCNC: 80 U/L (ref 30–120)
ALT SERPL-CCNC: 19 U/L (ref 10–68)
ANION GAP SERPL CALC-SCNC: 11.9 MMOL/L (ref 8–16)
APTT BLD: 30.6 SECONDS (ref 22.8–39.4)
BASOPHILS NFR BLD AUTO: 0.7 % (ref 0–2)
BILIRUB SERPL-MCNC: 0.41 MG/DL (ref 0.2–1.3)
BUN SERPL-MCNC: 34 MG/DL (ref 7–18)
CALCIUM SERPL-MCNC: 8.9 MG/DL (ref 8.5–10.1)
CHLORIDE SERPL-SCNC: 104 MMOL/L (ref 98–107)
CO2 SERPL-SCNC: 29.5 MMOL/L (ref 21–32)
CREAT SERPL-MCNC: 1 MG/DL (ref 0.6–1.3)
CRP SERPL-MCNC: < 0.2 MG/DL (ref 0–0.9)
D DIMER PPP FEU-MCNC: 0.95 UG/MLFEU (ref 0.2–0.54)
EOSINOPHIL NFR BLD: 1.7 % (ref 0–7)
ERYTHROCYTE [DISTWIDTH] IN BLOOD BY AUTOMATED COUNT: 15.6 % (ref 11.5–14.5)
GLOBULIN SER-MCNC: 3.1 G/L
GLUCOSE SERPL-MCNC: 98 MG/DL (ref 74–106)
HCT VFR BLD CALC: 40.7 % (ref 42–54)
HGB BLD-MCNC: 13.3 G/DL (ref 13.5–17.5)
INR PPP: 1.08 (ref 0.85–1.17)
LIPASE SERPL-CCNC: 62 U/L (ref 73–393)
LYMPHOCYTES # BLD: 2.8 10X3/UL (ref 1.32–3.57)
LYMPHOCYTES NFR BLD AUTO: 36 % (ref 15–50)
MAGNESIUM SERPL-MCNC: 2.3 MG/DL (ref 1.8–2.4)
MCH RBC QN AUTO: 30 PG (ref 26–34)
MCHC RBC AUTO-ENTMCNC: 32.6 G/DL (ref 31–37)
MCV RBC: 91.8 FL (ref 80–100)
MONOCYTES NFR BLD: 8.8 % (ref 2–11)
NEUTROPHILS # BLD: 4.1 10X3/UL (ref 1.78–5.38)
NEUTROPHILS NFR BLD AUTO: 52.8 % (ref 40–80)
NT-PROBNP SERPL-MCNC: 90 PG/ML (ref 0–450)
OSMOLALITY SERPL CALC.SUM OF ELEC: 290 MOSM/KG (ref 275–300)
PLATELET # BLD: 178 10X3/UL (ref 130–400)
PMV BLD AUTO: 9.5 FL (ref 7.4–10.4)
POTASSIUM SERPL-SCNC: 3.4 MMOL/L (ref 3.5–5.1)
PROT SERPL-MCNC: 6.7 G/DL (ref 6.4–8.2)
PROTHROMBIN TIME: 13 SECONDS (ref 11.6–15)
RBC # BLD AUTO: 4.44 10X6/UL (ref 4.2–6.1)
SODIUM SERPL-SCNC: 142 MMOL/L (ref 136–145)
TROPONIN I SERPL-MCNC: < 0.017 NG/ML (ref 0–0.06)
WBC # BLD AUTO: 7.8 10X3/UL (ref 4.8–10.8)

## 2021-05-25 NOTE — HEMODYNAMI
PATIENT:KARLEE CROCKER                                  MEDICAL RECORD: [id number]
: 35                                            LOCATION:[facility]     D.0
ACCT# [id number]                                       ADMISSION DATE: 21
 
 
 Generatedon:112:39
Patient name: KARLEE CROCKER Patient #: [id number] Visit #: [id number] SSN: [id number]
[id number] :
1935 Date of study: 2021
Page: Of
Hemodynamic Procedure Report
****************************
Patient Data
Patient Demographics
Procedure consent was obtained
First Name: KARLEE           Gender: Male
Last Name:           : 1935
Middle Initial: L           Age: 85 year(s)
Patient #: [id number]       Race: 
Visit #: [id number]
SSN: [id number]
Accession #:
[id number]
Additional ID: [id number]
Contact details
Address: [address] [place] [address]       Phone: [phone]
State: [place]
City: [place]
Zip code: [address]
Past Medical History
Allergies
Allergen        Reaction        Date         Comments
Reported
Other allergy                   2020    BIAXIN,
AZITHROMYCIN,
HYDRALIZINE,
DILTIAZEM
Other allergy                   2021    BIAXIN,
AZITHROMYCIN,
HYDRALAZINE,
DILTIAZEM
Admission
Admission Data
Admission Date: 2021   Admission Time: 1:43
Room #: [facility]
Height (in.): 67.72         BSA: 1.76 (m2)
Height (cm.): 172           BMI: 21.63 (kg/m2)
Weight (lbs.): 141.1
Weight (kg.): 64
Lab Results
Lab Result Date: 2021  Lab Result Time: 0:00
Biochemistry
Name         Units    Result                Min      Max
BUN          mg/dl    34       --(----)-*   7        18
Creatinine   mg/dl    1        --(--*-)--   0.6      1.3
eGFR         ml/min   85.96234 -*(----)--   90       120
NONAFRICAN
CBC
Name         Units    Result                Min      Max
 
Hemoglobin   g/dl     13.6     --(*---)--   13.5     17.5
Procedure
Procedure Types
Cath Procedure
Diagnostic Procedure
[facility] w/Coronaries
Sedation Charges
Moderate Sedation 25-39 minutes
PCI Procedure
Coronary Stent
Coronary Stent Initial
Hemochron ACT Test
Procedure Description
Procedure Date
Procedure Date: 2021
Procedure Start Time: 11:57
Procedure End Time: 12:37
Procedure Staff
Name                            Function
Henry Perez MD              Performing Physician
Martin Lerma RN              Nurse
Carole Villafuerte RT               Scrub
Katie Meléndez RT               Monitor
Procedure Data
Cath Procedure
Fluoroscopy
Diagnostic fluoroscopy      Total fluoroscopy Time: 7.7
time: 7.7 min               min
Diagnostic fluoroscopy      Total fluoroscopy dose:
dose: 1225 mGy              1225 mGy
Contrast Material
Contrast Material Type                       Amount (ml)
Isovue 300                                   127
Entry Location
Entry     Primary  Successful  Side  Size  Upsize Upsize Entry    Closure Succes
sful  Closure
Location                             (Fr)  1 (Fr) 2 (Fr) Remarks  Device        
      Remarks
Femoral                        Right 5 Fr  6 Fr                   Exoseal
artery                                     Short
Estimated blood loss: 10 ml
Diagnostic catheters
Device Type               Used For           End Catheter
Placement
MULTIPACK JL 4.0 5Fr      Procedure
catheter
MULTIPACK 3DRC 5Fr        Procedure
catheter
MULTIPACK Pigtail 5 Fr    Procedure
catheter
Procedure Complications
No complications
Procedure Medications
Medication           Administration Route Dosage
0.9% NaCl            I.V.                 100 ml/hr
Oxygen               etCO2 Nasal cannula  2 l/min
Heparin Flush Bag    added to field       2 bags
(1000units/500ml NS)
Lidocaine 2%         added to field       20
 
Benadryl             I.V.                 50 mg
Versed               I.V.                 0.5 mg
Fentanyl             I.V.                 25 mcg
Versed               I.V.                 0.5 mg
Heparin Bolus        I.V.                 5000 units
Hemodynamics
Rest
BSA: 1.76 (m2) HGB: 13.6 (g/dl) O2 Consumption: Estimated: 194.23 (ml/min) O2 Co
nsumption
indexed: Estimated:110.36 (ml/min/m) Heart Rate: 62 (bpm)
Pressure Samples
Time     Site     Value (mmHg) Purpose      Heart      Use
Rate(bpm)
12:04    LV       129/-3,3     Snapshot     65
Snapshots
Pre Cath      Intra         NCS           Post Cath
Vital Signs
Time     Heart  Resp   SPO2 etCO2   NIBP (mmHg) Rhythm  Pain    Sedation
Rate   (ipm)  (%)  (mmHg)                      Status  Level
(bpm)
11:29:22 63     15     97   39.1    179/82(157) NSR     0 (11)  10(A)
, No
pain
11:33:46 59     13     98   24.1    164/72(127) NSR     0 (11)  10(A)
, No
pain
11:38:00 59     13     98   36.2    141/80(126) NSR     0 (11)  10(A)
, No
pain
11:42:14 60     12     94   13.5    145/59(114) NSR     0 (11)  10(A)
, No
pain
11:46:26 60     15     95   33.1    150/73(124) NSR     0 (11)  10(A)
, No
pain
11:50:42 60     12     97   18      143/64(118) NSR     0 (11)  10(A)
, No
pain
11:54:54 60     13     96   0       127/67(107) NSR     0 (11)  10(A)
, No
pain
11:59:53 73     12     97   13.5    Measuring   NSR     0 (11)  10(A)
, No
pain
11:59:57 73     13     97   13.5    142/85(115) NSR     0 (11)  10(A)
, No
pain
12:04:07 60     15     98   19.5    141/70(120) NSR     0 (11)  10(A)
, No
pain
12:08:15 59     16     96   26.3    138/74(113) NSR     0 (11)  10(A)
, No
pain
12:12:25 60     19     96   21.1    140/67(124) NSR     0 (11)  10(A)
, No
pain
12:16:36 59     17     97   2.2     137/63(111) NSR     0 (11)  10(A)
, No
pain
12:20:45 60     23     94   9.8     132/70(105) NSR     0 (11)  10(A)
 
, No
pain
12:24:50 64     17     94   0.7     149/81(124) NSR     0 (11)  10(A)
, No
pain
12:28:58 66     15     95   16.5    151/86(131) NSR     0 (11)  10(A)
, No
pain
12:33:08 73     29     93   9.8     163/87(135) NSR     0 (11)  10(A)
, No
pain
Medications
Time     Medication       Route   Dose  Verified Delivered Reason          Notes
  Effectiveness
by       by
11:34:10 0.9% NaCl        I.V.    100   Martin Salazar   Per physician
ml/hr Maria Guadalupe Lerma RN       RN
11:34:19 Oxygen           etCO2   2     Martin  Martin   for low 02 sats
Nasal   l/min Maria Guadalupe Lerma
cannula       RN       RN
11:34:28 Heparin Flush    added   2     Martin  Martin   used for
Bag              to      bags  Maria Guadalupe Lerma   procedure
(1000units/500ml field         RN       RN
NS)
11:34:38 Lidocaine 2%     added   20ml  Martin  Martin   for local
to      vial  Maria Guadalupe Lerma   anesthetic
field         RN       RN
11:34:47 Benadryl         I.V.    50 mg Martin  Martin   Per physician
Maria Guadalupe Lerma RN       RN
11:55:29 Versed           I.V.    0.5   Martin  Martin   for sedation
mg    Maria Guadalupe Lerma RN       RN
11:55:37 Fentanyl         I.V.    25    Martin  Martin   for sedation
mcg   Maria Guadalupe Lerma RN       RN
11:59:35 Versed           I.V.    0.5   Martin  Martin   for sedation
mg    Maria Guadalupe Lerma RN       RN
12:10:45 Heparin Bolus    I.V.    5000  Martin  Martin   for
units Maria Guadalupe Lerma   anticoagulation
RN       RN
Procedure Log
Time     Note
11:11:29 Patient Height : 67.72 inches
11:11:33 Patient Weight : 141.1 lbs
11:12:18 Lab Result : Hemoglobin 13.6 g/dl
11:12:18 Lab Result : eGFR NONAFRICAN 85.04497 ml/min
11:12:18 Lab Result : BUN 34 mg/dl
11:12:18 Lab Result : Creatinine 1 mg/dl
11:13:04 Procedure Status Urgent Heart Cath (IP).
11:13:08 Martin Lerma RN sent for patient. Start room use.
11:20:13 Time tracking: Regular hours (M-F 7:00 - 5:00)
11:20:18 Plan of Care:Hemodynamics will remain stable., Cardiac
rhythm will remain stable., Comfort level will be
maintained., Respiratory function will remain
adequate., Patient/ family verbilizes understanding of
procedure., Procedure tolerated without complication.,
Recovers from procedure without complications..
 
11:20:55 Patient received from Med II to CCL 2 Alert and
oriented. Tansferred to table in Supine position.
11:20:58 Signed procedure consent form obtained from patient.
11:20:59 Warm blankets applied, and dieter hugger turned on for
patient comfort.
11:20:59 Correct patient and procedure confirmed by team.
11:21:00 ECG and BP/O2 sat monitors applied to patient.
11::15 Vital chart was started
11::16 Baseline sample Acquired.
::20 Rhythm: sinus rhythm
: Full Disclosure recording started
: Pre-procedure instructions explained to patient.
: Pre-op teaching completed and patient verbalized
understanding.
11::23 Family unavailable.
11::24 Patient NPO since Midnight.
11::27 Is the patient allergic to Iodine/contrast media? No.
11::28 Is patient on blood thinner?Yes
11::29 **ACC** The patient was administered the following
blood thiners within the last 24 hours: **ACC**Plavix
11::39 PT STATES HE HAD PLAVIX YESTERDAY
11::40 Patient diabetic? No.
11:30:00 Patient allergic to Other allergyBIAXIN, AZITHROMYCIN,
HYDRALAZINE, DILTIAZEM
11:30:04 Previous problem with sedation/anesthesia? No ?
11:30:06 Snore? Yes
11:30:07 Sleep apnea? No
11:30:08 Deviated septum? No
11:30:08 Opens mouth fully? Yes
11:30:10 Sticks out tongue? Yes
11:30:15 Airway obstruction? No ?
11:30:17 Dentures? No ?
11:30:20 Pre procedure: right dorsailis pedis pulse 1+
Palpable, but thready & weak; easily obliterated
11:30:22 Patient pain scale 0/10 ?.
11:30:28 IV patent on arrival in left antecubital with
0.45%NaCl at 10ml/hr.
11:30:30 Lab results completed and on chart.
11:30:33 Right groin area was prepped with chlora-prep and
draped in sterile fashion
11:30:34 Alarms reviewed by R. N.
11:30:35 Sharps counted by scrub and verified by R.N.
11:34:10 0.9% NaCl 100 ml/hr I.V. was administered by Martin Lerma RN; Per physician; Verbal order read back and
verified.
11:34:19 Oxygen 2 l/min etCO2 Nasal cannula was administered by
Martin Lerma RN; for low 02 sats; Verbal order read
back and verified.
11:34:28 Heparin Flush Bag (1000units/500ml NS) 2 bags added to
field was administered by Martin Lerma RN; used for
procedure; Verbal order read back and verified.
11:34:38 Lidocaine 2% 20ml vial added to field was administered
by Martin Lerma RN; for local anesthetic; Verbal
order read back and verified.
11:34:47 Benadryl 50 mg I.V. was administered by Martin Lerma RN; Per physician; Verbal order read back and
verified.
11:43:33 Use device set Femoral Dx
11:45:45 ACIST Syringe (19742) opened to sterile field.
11:45:45 Bag Decanter (2002S) opened to sterile field.
 
11:45:46 ACIST Hand Control (49753) opened to sterile field.
11:45:47 ACIST Manifold (77811) opened to sterile field.
11:45:47 Tegaderm 4 x 4 (1626W) opened to sterile field.
11:45:49 Medline Cath Pack ([id number]) opened to sterile field.
11:45:49 DIAGNOSTIC Multipack 5Fr catheter set ([id number]) opened
to sterile field.
11:45:51 SHEATH 5FR [place] (KXZ253) opened to sterile field.
11:45:51 EMERALD Guide Wire (702-765) opened to sterile field.
11:48:35 Zero performed for pressure channel P1
11:53:34 --------ALL STOP TIME OUT------
11:53:34 Final Timeout: patient, procedure, and site verified
with staff and physician. All members of the team are
in agreement.
11:53:37 Right groin site verified by team.
11:53:40 Fire Safety Assessment: A--An alcohol-based skin
anteseptic being used preoperatively., C--Open oxygen
or nitrous oxide is being used., D--An ESU, laser, or
fiber-optic light is being used.
11:53:42 Physical assessment completed. ASA score P 2 - A
patient with mild systemic disease as per Henry Perez MD.
11:53:45 2) 60-89 Mildly reduced kidney function, and other
findings (as for stage 1) point to kidney disease.
11:53:47 Maximum allowable contrast dose (3.7 X eGFR X 0.75)208
ml.
11:53:51 Sedation plan: IV Moderate Sedation Medication:Versed,
Fentanyl
11:55:29 Versed 0.5 mg I.V. was administered by Martin Lerma RN; for sedation; Verbal order read back and verified.
11:55:37 Fentanyl 25 mcg I.V. was administered by Martin Lerma RN; for sedation; Verbal order read back and
verified.
11:56:56 Procedure started.
11:57:17 Local anesthetic to right femoral artery with
Lidocaine 2% by Henry Perez MD.**INITIAL ACCESS
ONLY**
11:58:44 A 5 Fr sheath was inserted into the Right Femoral
artery
11:59:23 A MULTIPACK JL 4.0 5Fr catheter was advanced over the
wire and used for Procedure.
11:59:35 Versed 0.5 mg I.V. was administered by Martin Lerma
RN; for sedation; Verbal order read back and verified.
12:01:26 LCA angiography performed.
12:01:32 Catheter removed.
12:01:38 A MULTIPACK 3DRC 5Fr catheter was advanced over the
wire and used for Procedure.
12:02:42 RCA angiography performed.
12:02:43 Catheter removed.
12:02:48 A MULTIPACK Pigtail 5 Fr catheter was advanced over
the wire and used for Procedure.
12:04:14 LV gram done using SANTOS
12:04:17 Injector settings: Ml/sec: 10, Volume: 20,
12:04:54 LV hemodynamics recorded.
12:05:05 EF : 55 %
12:05:10 Catheter removed.
12:05:11 Proceeding to intervention.
12:06:26 SHEATH 6FR [place] (HUP352) opened to sterile field.
12:06:27 INFLATOR Merit BasixCompak (AH2023) opened to sterile
field.
12:06:27 GUIDE 6FR HS I catheter (LA6HSI) opened to sterile
 
field.
12:06:34 Sheath upsized to a 6 Fr Short.
12:07:42 6 Fr HS 1 guide catheter was inserted over the wire
12:07:53 GUIDE REMOVED. UNABLE TO ENGAGE
12:08:57 GUIDE 6FR JR 4.0 SH catheter ([id number]) opened to
sterile field.
12:09:28 6 Fr JR 4 SH guide catheter was inserted over the wire
12:10:45 Heparin Bolus 5000 units I.V. was administered by
Martin Lerma RN; for anticoagulation; Verbal order
read back and verified.
12:11:30 Pre PCI Site: Native RCA has 95% stenosis.
12:11:35  wire advanced.
12:13:15 Inflate balloon Inflation number: 1 A EUPHORA 3.0 x 15
Balloon ([id number]) was prepped and advanced across the
Mid RCA , then inflated to 12 CIPRIANO for 0:00 (min:sec) .
12:14:05 Inflation number: 2 The EUPHORA 3.0 x 15 Balloon
([id number]) was reinflated across the Mid RCA , to 12
CIPRIANO for 0:00 (min:sec) .
12:15:07 Balloon removed over the wire.
12:17:39 Place stent Inflation Number: 3 A LESVIA OTW 3.5 x 18
stent ([id number]) was prepped and advanced across
the Mid RCA . The stent was deployed at 14 CIPRIANO for
0:00 (min:sec) .
12:18:24 Stent catheter was removed intact over wire.
12:22:34 Place stent Inflation Number: 1 A LESVIA OTW 3.5 x 12
stent ([id number]) was prepped and advanced across
the Prox RCA . The stent was deployed at 14 CIPRIANO for
0:00 (min:sec) .
12:22:44 Stent catheter was removed intact over wire.
12:22:45 Wire removed.
12:22:45 Guide catheter removed.
12:23:16 EXOSEAL 6Fr () opened to sterile field.
12:24:08 Sheath removed intact; hemostasis achieved with
Exoseal to the Right Femoral artery.
12:24:10 Procedure ended.(Physican Out)
12:24:17 Contrast amount:Isovue 300 127ml.
12:24:27 Fluoroscopy time 07.70 minutes.
12:24:31 Flurop Dose total: 1225
12:24:31 Fluoroscopy dose: 1225 mGy
12:24:36 Dose Area Product 17517 mGy/cm.
12:24:39 Maximum allowable dose exceeded? No.
12:24:40 Sharps counted by scrub and verified by R.N.
12:24:43 Post-op/insertion site Right Femoral artery dressed
using a 4 x 4 and Tegaderm.
12:24:47 Post-procedure physical assessment completed. ASA
score P 2 - A patient with mild systemic disease as
per Henry Perez MD.
12:24:49 Post procedure rhythm: sinus rhythm
12:24:52 Estimated blood loss: 10 ml
12:24:53 Post procedure instruction explained to
patient.Patient verbalizes understanding.
12:24:53 Patient needs reinforcement of post procedure
teaching.
12:25:22 Procedure type changed to Cath procedure, Diagnostic
procedure, LHC, [facility] w/Coronaries, Sedation Charges,
Moderate Sedation 25-39 minutes, PCI procedure,
Coronary Stent, Coronary Stent Initial, Hemochron ACT
Test
12::38 Procedure and supply charges have been captured,
reviewed, submitted and are correct.
 
12:28:03 Procedure Complication : No complications
12::07 [facility] Findings: MVD- PCI performed (see procedure note)
12:29:42 ACT drawn and resulted at 261 seconds. (normal
therapeutic range 180-240 seconds).
12:34:16 Vital chart was stopped
12:34:17 Operative report dictated upon procedure completion.
12:34:18 See physician's report for complete and final results.
12:34:20 Report given to Med II.
12:34:23 Patient transfered to Med II with Bed.
12:35:09 FEMSTOP Gold ([id number]) opened to sterile field.
12:35:36 Femstop placed over the right femoral artery at 160
mmHg. Hemostasis achieved.
12:37:30 Procedure ended.
12:37:30 Full Disclosure recording stopped
12:37:41 End room use (Document Last)
12:38:20 Procedure ended.(Physican Out)
Intervention Summary
Intervention Notes
Time     ActionType  Lesion and  Equipment     Action#  Pressure  Duration
Attributes  Used
12:13:15 Inflate     Mid RCA     EUPHORA 3.0 x 1        12        00:00
balloon                 15 Balloon
([id number])
12:14:05 Reinflate   Mid RCA     EUPHORA 3.0 x 2        12        00:00
balloon                 15 Balloon
([id number])
12:17:39 Place stent Mid RCA     LESVIA OTW 3.5  3        14        00:00
x 18 stent
([id number])
12:22:34 Place stent Prox RCA    LESVIA OTW 3.5  1        14        00:00
x 12 stent
([id number])
Device Usage
Item Name     Manufacture  Quantity  Catalog      Hospital Part     Current Mini
mal Lot# /
Number       Charge   Number   Stock   Stock   Serial#
Code
ACIST Syringe Acist        1         98240        460625   512278   577200  20
(88396)       Medical
Systems Inc
Bag Decanter  Microtek     1                 593331   81541    810694  5
(S)       Medical Inc.
ACIST Hand    Acist        1         93802        428126   415611   814019  5
Control       Medical
(15325)       Systems Inc
ACIST         Acist        1         31068        170147   296419   348998  5
Manifold      Medical
(56319)       Systems Inc
Tegaderm 4 x  3M           1         1626W        646125   903032   869959  5
4 (1626W)
Medline Cath  Medline      1         [id number]    727559   11767    282637  5
Pack
([id number])
DIAGNOSTIC    Cardinal     1         ID9946       088598   66112    494559  30
Multipack 5Fr Health
catheter set
(JB0472)
SHEATH 5FR    Terumo       1         KBF717       657371   275936   383307  5
[place]
(QTL633)
 
EMERALD Guide Cardinal     1         502-455      445556   883459   254234  5
Wire          Health
(502-455)
MULTIPACK JL  Cardinal     1                                        702853  5
4.0 5Fr       Health
catheter
MULTIPACK     Cardinal     1                                        610456  5
3DRC 5Fr      Health
catheter
MULTIPACK     Cardinal     1                                        150865  5
Pigtail 5 Fr  Health
catheter
SHEATH 6FR    Terumo       1         GVY890       279893   678832   104152  40
[place]
(VIT479)
INFLATOR      Merit        1         PS8312       457978   398133   904412  15
[facility]
BasixCompak
(DU9802)
GUIDE 6FR HS  Medtronic    1         LA6HSI       418378   09652    708684  1
I catheter
(LA6HSI)
GUIDE 6FR JR  Medtronic    1         [id number]    637157   24372    372358  1
4.0 SH
catheter
([id number])
EUPHORA 3.0 x Medtronic    1         [id number]     366439   814106   742590  5   
    [id number]
15 Balloon
([id number])
LESVIA OTW 3.5  Medtronic    1         [id number]  125005   5703337  298070  5   
    [id number]
x 18 stent
([id number])
LESVIA OTW 3.5  Medtronic    1         [id number]  610401   0805728  827972  5   
    [id number]
x 12 stent
([id number])
EXOSEAL 6Fr   Cardinal     1                 369731   219949   736448  10
()       [facility]
FEMSTOP [facility]      1         [id number]       251485   743615   735110  5
([id number])
Signature Audit [place]
Stage           Time        Signature      Unsigned
Intra-Procedure 2021   Katie Meléndez
12:37:55 PM RT(R)
Intra-Procedure 2021   Martin
12:38:20 PM Maria Guadalupe RN
Intra-Procedure 2021   Henry Olson
12:39:01 PM Ori REYES
Signatures
Performing Physician :  Signature :
Henry Perez MD      ______________________________
Date : ______________ Time :
______________
Nurse : Martin Lerma  Signature :
RN                       ______________________________
Date : ______________ Time :
______________
Monitor : Katieashley Meléndez Signature :
 
RT                       ______________________________
Date : ______________ Time :
______________
 
 
 
 
 
 
 
 
 
 
 
 
 
 
 
 
 
 
 
 
 
 
 
 
 
 
 
 
 
 
 
 
 
 
 
 
 
 
 
 
 
 
 
 
 
 
 
 
 
 
 
 
[address] [address] [place] [address]

## 2021-05-26 VITALS — DIASTOLIC BLOOD PRESSURE: 78 MMHG | SYSTOLIC BLOOD PRESSURE: 157 MMHG

## 2021-05-26 VITALS — SYSTOLIC BLOOD PRESSURE: 177 MMHG | DIASTOLIC BLOOD PRESSURE: 87 MMHG

## 2021-05-26 VITALS — SYSTOLIC BLOOD PRESSURE: 157 MMHG | DIASTOLIC BLOOD PRESSURE: 78 MMHG

## 2021-05-26 VITALS — DIASTOLIC BLOOD PRESSURE: 59 MMHG | SYSTOLIC BLOOD PRESSURE: 116 MMHG

## 2021-05-26 VITALS — SYSTOLIC BLOOD PRESSURE: 171 MMHG | DIASTOLIC BLOOD PRESSURE: 76 MMHG

## 2021-05-26 VITALS — SYSTOLIC BLOOD PRESSURE: 123 MMHG | DIASTOLIC BLOOD PRESSURE: 53 MMHG

## 2021-05-26 VITALS — SYSTOLIC BLOOD PRESSURE: 183 MMHG | DIASTOLIC BLOOD PRESSURE: 85 MMHG

## 2021-05-26 LAB
ALBUMIN SERPL-MCNC: 3.4 G/DL (ref 3.4–5)
ALP SERPL-CCNC: 85 U/L (ref 30–120)
ALT SERPL-CCNC: 20 U/L (ref 10–68)
ALT SERPL-CCNC: 22 U/L (ref 10–68)
ANION GAP SERPL CALC-SCNC: 7.4 MMOL/L (ref 8–16)
BASOPHILS NFR BLD AUTO: 0.4 % (ref 0–2)
BILIRUB SERPL-MCNC: 0.4 MG/DL (ref 0.2–1.3)
BUN SERPL-MCNC: 27 MG/DL (ref 7–18)
CALCIUM SERPL-MCNC: 8.6 MG/DL (ref 8.5–10.1)
CHLORIDE SERPL-SCNC: 105 MMOL/L (ref 98–107)
CHOLEST/HDLC SERPL: 2.6 RATIO (ref 2.3–4.9)
CK MB SERPL-MCNC: 1.1 U/L (ref 0–3.6)
CK MB SERPL-MCNC: 1.4 U/L (ref 0–3.6)
CK MB SERPL-MCNC: 1.7 U/L (ref 0–3.6)
CK SERPL-CCNC: 56 UL (ref 21–232)
CK SERPL-CCNC: 64 UL (ref 21–232)
CK SERPL-CCNC: 68 UL (ref 21–232)
CO2 SERPL-SCNC: 30.7 MMOL/L (ref 21–32)
CREAT SERPL-MCNC: 0.9 MG/DL (ref 0.6–1.3)
EOSINOPHIL NFR BLD: 1.8 % (ref 0–7)
ERYTHROCYTE [DISTWIDTH] IN BLOOD BY AUTOMATED COUNT: 15.6 % (ref 11.5–14.5)
GLOBULIN SER-MCNC: 3.2 G/L
GLUCOSE SERPL-MCNC: 85 MG/DL (ref 74–106)
HCT VFR BLD CALC: 40.1 % (ref 42–54)
HDLC SERPL-MCNC: 93 MG/DL (ref 32–96)
HGB BLD-MCNC: 13.6 G/DL (ref 13.5–17.5)
LDL-HDL RATIO: 1.5 RATIO (ref 1.5–3.5)
LDLC SERPL-MCNC: 137 MG/DL (ref 0–100)
LYMPHOCYTES NFR BLD AUTO: 33.2 % (ref 15–50)
MAGNESIUM SERPL-MCNC: 2.5 MG/DL (ref 1.8–2.4)
MCH RBC QN AUTO: 31.1 PG (ref 26–34)
MCHC RBC AUTO-ENTMCNC: 33.8 G/DL (ref 31–37)
MCV RBC: 92 FL (ref 80–100)
MONOCYTES NFR BLD: 9.2 % (ref 2–11)
NEUTROPHILS # BLD: 4.1 10X3/UL (ref 1.78–5.38)
NEUTROPHILS NFR BLD AUTO: 55.4 % (ref 40–80)
OSMOLALITY SERPL CALC.SUM OF ELEC: 281 MOSM/KG (ref 275–300)
PHOSPHATE SERPL-MCNC: 3 MG/DL (ref 2.5–4.9)
PLATELET # BLD: 163 10X3/UL (ref 130–400)
PMV BLD AUTO: 9.6 FL (ref 7.4–10.4)
POTASSIUM SERPL-SCNC: 4.1 MMOL/L (ref 3.5–5.1)
PROT SERPL-MCNC: 6.6 G/DL (ref 6.4–8.2)
RBC # BLD AUTO: 4.36 10X6/UL (ref 4.2–6.1)
SODIUM SERPL-SCNC: 139 MMOL/L (ref 136–145)
TRIGL SERPL-MCNC: 43 MG/DL (ref 30–200)
TROPONIN I SERPL-MCNC: < 0.017 NG/ML (ref 0–0.06)
WBC # BLD AUTO: 7.5 10X3/UL (ref 4.8–10.8)

## 2021-05-26 PROCEDURE — B2151ZZ FLUOROSCOPY OF LEFT HEART USING LOW OSMOLAR CONTRAST: ICD-10-PCS | Performed by: INTERNAL MEDICINE

## 2021-05-26 PROCEDURE — 027035Z DILATION OF CORONARY ARTERY, ONE ARTERY WITH TWO DRUG-ELUTING INTRALUMINAL DEVICES, PERCUTANEOUS APPROACH: ICD-10-PCS | Performed by: INTERNAL MEDICINE

## 2021-05-26 PROCEDURE — 4A023N7 MEASUREMENT OF CARDIAC SAMPLING AND PRESSURE, LEFT HEART, PERCUTANEOUS APPROACH: ICD-10-PCS | Performed by: INTERNAL MEDICINE

## 2021-05-26 PROCEDURE — B2111ZZ FLUOROSCOPY OF MULTIPLE CORONARY ARTERIES USING LOW OSMOLAR CONTRAST: ICD-10-PCS | Performed by: INTERNAL MEDICINE

## 2021-05-26 NOTE — NUR
JAY Funes called report, states 2 stents placed in RCA, gave Heparin 5000
units/versed 1 mg/Fentnyl 25mics, went in rt femoral, closed with 6 FR
Exoseal, Femstop placed d/t bleeding.

## 2021-05-26 NOTE — NUR
Sitting up in bed, awake/alert/oriented, T/R self ad kenroy, cont of B/B with
BRPs per self ad kenroy, denies pain/other discomfort at this time, call
light/phone within reach, NPO, no s/s of acute distress observed.

## 2021-05-26 NOTE — NUR
Pt states "I didn't feel anything last time they did this but I was awake and
felt everything they did", asked if he told them he felt it and he replied
"I'm sure they knew it", I asked how they would know and his reply was "my
grunting/groaning".

## 2021-05-26 NOTE — NUR
REPORT RECEIVED. PT A&O, UP IN BED WITH FAMILY AT BEDSIDE. NO S/S OF DISTRESS
OBSERVED. DRESSING TO R GROIN C/D/I. NO S/S OF HEMATOMA. PULSE PRESENT. IV TO
L WRIST PATENT, SL, SWAB CAPS IN USE. SR 65 ON TELE. BED LOCKED AND LOWERED,
CL IN REACH. ASSESSMENT COMPLETE. WILL CONT POC.

## 2021-05-27 VITALS — DIASTOLIC BLOOD PRESSURE: 74 MMHG | SYSTOLIC BLOOD PRESSURE: 157 MMHG

## 2021-05-27 VITALS — SYSTOLIC BLOOD PRESSURE: 158 MMHG | DIASTOLIC BLOOD PRESSURE: 80 MMHG

## 2021-05-27 VITALS — SYSTOLIC BLOOD PRESSURE: 136 MMHG | DIASTOLIC BLOOD PRESSURE: 73 MMHG

## 2021-05-27 VITALS — DIASTOLIC BLOOD PRESSURE: 53 MMHG | SYSTOLIC BLOOD PRESSURE: 123 MMHG

## 2021-05-27 VITALS — DIASTOLIC BLOOD PRESSURE: 73 MMHG | SYSTOLIC BLOOD PRESSURE: 136 MMHG

## 2021-05-27 VITALS — DIASTOLIC BLOOD PRESSURE: 80 MMHG | SYSTOLIC BLOOD PRESSURE: 158 MMHG

## 2021-05-27 LAB
ALBUMIN SERPL-MCNC: 3.7 G/DL (ref 3.4–5)
ALP SERPL-CCNC: 102 U/L (ref 30–120)
ALT SERPL-CCNC: 20 U/L (ref 10–68)
ANION GAP SERPL CALC-SCNC: 8.8 MMOL/L (ref 8–16)
BASOPHILS NFR BLD AUTO: 0.4 % (ref 0–2)
BILIRUB SERPL-MCNC: 0.38 MG/DL (ref 0.2–1.3)
BUN SERPL-MCNC: 16 MG/DL (ref 7–18)
CALCIUM SERPL-MCNC: 8.9 MG/DL (ref 8.5–10.1)
CHLORIDE SERPL-SCNC: 102 MMOL/L (ref 98–107)
CO2 SERPL-SCNC: 32.2 MMOL/L (ref 21–32)
CREAT SERPL-MCNC: 0.9 MG/DL (ref 0.6–1.3)
EOSINOPHIL NFR BLD: 1.6 % (ref 0–7)
ERYTHROCYTE [DISTWIDTH] IN BLOOD BY AUTOMATED COUNT: 15.8 % (ref 11.5–14.5)
GLOBULIN SER-MCNC: 3.5 G/L
GLUCOSE SERPL-MCNC: 108 MG/DL (ref 74–106)
HCT VFR BLD CALC: 43.5 % (ref 42–54)
HGB BLD-MCNC: 14.9 G/DL (ref 13.5–17.5)
LYMPHOCYTES NFR BLD AUTO: 23.5 % (ref 15–50)
MAGNESIUM SERPL-MCNC: 2.6 MG/DL (ref 1.8–2.4)
MCH RBC QN AUTO: 31.2 PG (ref 26–34)
MCHC RBC AUTO-ENTMCNC: 34.3 G/DL (ref 31–37)
MCV RBC: 91 FL (ref 80–100)
MONOCYTES NFR BLD: 10.9 % (ref 2–11)
NEUTROPHILS # BLD: 4.4 10X3/UL (ref 1.78–5.38)
NEUTROPHILS NFR BLD AUTO: 63.6 % (ref 40–80)
OSMOLALITY SERPL CALC.SUM OF ELEC: 279 MOSM/KG (ref 275–300)
PHOSPHATE SERPL-MCNC: 2.2 MG/DL (ref 2.5–4.9)
PLATELET # BLD: 179 10X3/UL (ref 130–400)
PMV BLD AUTO: 9.7 FL (ref 7.4–10.4)
POTASSIUM SERPL-SCNC: 4 MMOL/L (ref 3.5–5.1)
PROT SERPL-MCNC: 7.2 G/DL (ref 6.4–8.2)
RBC # BLD AUTO: 4.78 10X6/UL (ref 4.2–6.1)
SODIUM SERPL-SCNC: 139 MMOL/L (ref 136–145)
WBC # BLD AUTO: 6.9 10X3/UL (ref 4.8–10.8)

## 2021-05-27 NOTE — CN
PATIENT NAME:KARLEE CROCKER                               MEDICAL RECORD: [id number]
: 35                                              LOCATION:D. D.0
ADMIT DATE: 21                                       ACCOUNT: [id number]
CONSULTING PHYSICIAN:    MILY MEDINA MD          
                                               
REFERRING PHYSICIAN:     JODY TRAN MD               
 
 
DATE OF CONSULTATION:  2021
 
HISTORY OF PRESENT ILLNESS:  An 85-year-old gentleman with known history of
coronary artery disease, status post stent to the LAD.  He had residual disease
to the right that has been managed with a combination of calcium channel blocker
and long-acting nitrates, admitted with acute coronary syndrome with chest
heaviness and tightness, reminiscent of his previous angina.  He has strong
family history of dyslipidemia, hypertension, and known history of coronary
artery disease in the past.
 
PAST MEDICAL HISTORY:  Includes history of;
1.  Hypertension.
2.  Hyperlipidemia.
3.  Sick sinus syndrome, status post permanent pacemaker placement.
 
ALLERGIES:  BIAXIN, AZITHROMYCIN, HYDRALAZINE, DILTIAZEM.
 
SOCIAL HISTORY:  Lives by himself.  Nonsmoker, nondrinker.  He takes care of all
his ADLs.  Does try to walk on a regular basis.
 
FAMILY HISTORY:  Hypertension, dyslipidemia.
 
REVIEW OF SYSTEMS:  The patient reports easy bruising but reports no swollen
glands. The patient reports no fever, no night sweats, no significant weight
gain, no significant weight loss.  No significant exercise tolerance.  The
patient reports no dry eyes, no irritation, no vision change.  Patient reports
no difficulty hearing and no ear pain.  Patient reports no frequent nose bleeds
or nose and sinus problems.  Patient reports on arm pain on exertion.   No
shortness of breath while lying down.  No history of heart murmur.  Patient
reports no cough, no wheezing or coughing up blood.  Patient reports no
abdominal pain, no vomiting.  Normal appetite.  No diarrhea and not vomiting
blood.  No nausea and no constipation.  Patient reports no incontinence.  No
difficulty urinating.  No hematuria.  No increased frequency.  Patient reports
no muscle aches.  No weakness, no arthralgias, no back pain.  No swelling of the
extremities.  Patient reports no abnormal mole, no jaundice, no rashes.  Reports
no loss of consciousness.  No weakness and no numbness.  No seizures, dizziness,
or headaches.  The patient reports no depression, no sleep disturbance, feeling
safe in a relationship and no alcohol abuse.  Patient reports on fatigue. 
Reports no runny nose or sinus pressure.  No itching, no hives, and no frequent
sneezing.  
 
MEDICATIONS:  Include Plavix 75 daily, amlodipine 10 mg p.o. daily, isosorbide
40 mg p.o. b.i.d., clonidine 0.2 b.i.d., Dyazide one daily.
 
PHYSICAL EXAMINATION:
GENERAL:  No acute distress, appears stated age, alert and oriented.
VITAL SIGNS:  Blood pressure 137/78, pulse 63 and regular.
HEENT:  Normocephalic, atraumatic.
NECK:  No bruits noted.
HEART:  Regular.  S4 gallop is noted.
 
 
 
CONSULT REPORT                                 [id number]    KARLEE CROCKER             
 
 
LUNGS:  Good air excursion.
ABDOMEN:  Soft and nontender.
EXTREMITIES:  Pulses 2+. No edema.
NEUROLOGIC:  Grossly intact.
 
IMPRESSION:  Acute coronary syndrome, known underlying disease.
 
PLAN:  For angiography and intervention based on above.
 
TRANSINT:[id number] Voice Confirmation ID: [id number] DOCUMENT ID: [id number]
                                           
                                           MILY MEDINA MD          
 
 
 
Electronically Signed by MILY MEDINA on 21 at 0819
 
 
 
 
 
 
 
 
 
 
 
 
 
 
 
 
 
 
 
 
 
 
 
 
 
 
 
 
 
 
 
CC:                                                             [id number]
DICTATION DATE: 21 1232     :     21 2148      ADM IN  
                                                                              
[facility]                                          
[address] [place] [address]

## 2021-05-27 NOTE — NUR
Provided written/verbal discharge instructions/education to which pt voiced
understanding, discontinued IV access/cardiac telemetry monitoring.

## 2021-05-27 NOTE — MORECARE
CASE MANAGEMENT DISCHARGE SUMMARY
 
 
PATIENT: KARLEE CROCKER                     UNIT: [id number]
ACCOUNT#: [id number]                       ADM DATE: 21
AGE: 85     : 35  SEX: M            ROOM/BED: D.    
AUTHOR: MEGHAN,DOC                             PHYSICIAN:                               
 
REFERRING PHYSICIAN: JODY TRAN MD               
DATE OF SERVICE: 21
Case Management Discharge Planning Summary
 
 
 
 
 
DCP REVIEW SUMMARY
ANTICIPATED D/C DATE: 2021
EXPECTED LOS : 1
CASE STATUS:  DCP Initiated
INITIAL REVIEW:  2021
INITIAL REVIEWER:   America James
FINAL DISCHARGE DISPOSITION: 01 : Home or Self Care (Routine Discharge)
FINAL REVIEWER:  
FINAL REVIEW DATE: 
 
  
 
DCP Focus Questions & Answers
 
 
QUESTION: ANSWER
 : 
 
 
 
 
 
PATIENT:  KARLEE CROCKER
ENCOUNTER:  [id number]
MEDICAL RECORD#:  [id number]
ADMISSION DATE:  2021
DISCHARGE DATE:  
ATTENDING MD:  JODY BARNES
:   1935-Aug-12
AGE:  85
MARITAL STATUS:   W
DC PLAN ID:  [id number]
 
FACILITY:   [facility]
PRINTED ON: 21  16:39  CT
 
 
 
 
 
 
 
 
**All edits/amendments must be made on the electronic document**
 
DICTATION DATE: 21     : ANALIA  21 163     
RPT#: [id number]                                DC DATE:        
                                               STATUS: ADM IN  
[facility]
 [place] [address]
***END OF REPORT***

## 2021-05-27 NOTE — OP
PATIENT NAME:  KARLEE CROCKER                           MEDICAL RECORD: [id number]
:35                                             LOCATION:D.M2     D.
                                                         ADMISSION DATE:21
SURGEON:  MILY MEDINA MD          
 
 
DATE OF OPERATION:  2021
 
PROCEDURE:  Left heart catheterization, selective coronary angiography, right
femoral artery approach.
 
CATHETERS:  A 5-Nepali sheath, 5/4 Tho, 5/4 pig.
 
The procedure was well tolerated.  The patient was returned to the smith, sheath
removed.  ExoSeal device was placed.
 
FINDINGS:  Left ventriculography in 30-degree SANTOS view.  Normal wall motion. 
Normal systolic function.
 
CORONARY ANATOMY:
Left main:  Left main is free of disease.
LAD:  LAD, previously placed stent is widely patent without evidence of
restenosis.  No progression of disease.
CIRCUMFLEX:  Free of disease.
RIGHT CORONARY ARTERY:  In the area of an insignificant lesion in its midportion
has a fissured plaque about 90% with FERNANDO flow 2 distally obviously infarct
related.  Additionally, he has an ostial lesion not appreciated previously.
 
PLAN:  Intervention right momentarily.  A 5-Nepali sheath was exchanged for a
6-Nepali sheath.  A BMW wire was placed across both lesions including a 90% plus
fissure plaque.  Pre-deployment balloon was a 3.0 x 15 mm Wood.  Stents
deployed were 3.5 x 18 to cover the 90% stenosis and a 3.5 x 12 to cover the
ostial 80% stenosis.  FERNANDO flow was 3 throughout the procedure.  Heparin was
used during the case.  The patient was placed on Plavix.  Sheath closed with
ExoSeal device.
 
TRANSINT:[id number] Voice Confirmation ID: [id number] DOCUMENT ID: [id number]
                                           
                                           MILY MEDINA MD          
 
 
 
Electronically Signed by MILY MEDINA on 21 at 0819
 
 
 
 
 
 
 
CC:                                                             [id number]
DICTATION DATE: 21 1229     :     213      ADM IN  
                                                                              
[place] [address]

## 2021-05-27 NOTE — MORECARE
CASE MANAGEMENT DISCHARGE SUMMARY
 
 
PATIENT: KARLEE CROCKER                     UNIT: [id number]
ACCOUNT#: [id number]                       ADM DATE: 21
AGE: 85     : 35  SEX: M            ROOM/BED: D.2110    
AUTHOR: MEGHANDOC                             PHYSICIAN:                               
 
REFERRING PHYSICIAN: JODY TRAN MD               
DATE OF SERVICE: 21
Case Management Discharge Planning Summary
 
 
COMMENTS 
ENTERED DATE:  21  16:41 CT
COMMENT TYPE:  Discharge Planning
REVIEWER: America James
CM met with patient to complete discharge planning assessment and offer 
availability of needed services. Patient states that he lives independently 
at  prior to admission. Daughter is present in room at time of DCP and 
verified that home environment is safe and has electricity and running water.
 Patient denies need for transportation and state that they have funds for 
services and medications if needed. PCP is Isidro and patient uses [facility] for pharmacy. CM offered and discussed home health, rehab 
services, and need for any medical equipment. Patient did not express need 
for offered services at this time. Transportation home will be provided by 
yamile Winkler ([phone]). Patient verbalized no need at time of 
discharge.
 
 
DCP REVIEW SUMMARY
ANTICIPATED D/C DATE: 2021
EXPECTED LOS : 1
CASE STATUS:  DCP Initiated
INITIAL REVIEW:  2021
INITIAL REVIEWER:   America James
FINAL DISCHARGE DISPOSITION: 01 : Home or Self Care (Routine Discharge)
FINAL REVIEWER:  
FINAL REVIEW DATE: 
 
  
 
DCP Focus Questions & Answers
 
DCP Screen
QUESTION: ANSWER
High Risk Factors: : None
Walking limitation:  Patient stated self rated walking limitation present? : 
No
Age: : 80 +
 
Prior living environment: : Lives Alone
Disability ranking: : Grade 1:  No significant disability
 
 
 
DCP Evaluation
QUESTION: ANSWER
Patient and/or caregiver agree upon recommended discharge plan? : Yes
Family / Caregiver's ability to cope with chronic illness: : a. Adequate 
(ability to meet patient's medical needs, ensures patient attends medical 
appts.)
Patient's current cognitive status: : *Oriented to person, place, situation, 
time and present
Patient's ability to cope with chronic illness : d. No chronic illness
Patient gives permission to discuss discharge plans with:  (name, 
relationship and number) : YAMILE WINKLER [phone]
 
Does the patient have the ability to pay for or attain post discharge needs 
/ services? : N/A
Functional screen assessment: : Basic needs can adequately be met by self
Family / Caregiver's ability to cope with chronic illness: : a. Adequate 
(ability to meet patient's medical needs, ensures patient attends medical 
appts.)
Physical Status: : Independent with ADL's
Equipment needed for post hospitalization: : None
Is there a likelihood that the patient will require additional services to 
return to the preadmission environment? : N/A
Living Arrangements: : Home Alone with Support
Results of this evaluation have been discussed with: : Patient
Patient with capacity for self-care or can be cared for in same environment 
as prior to hospitalization? : Yes
Baseline cognitive status: : *Oriented to person, place, situation, time and 
present
Physical environment modification needed / anticipated for discharge: : N/A
Medication Management: : Patient states can read and understand medication 
labels
Medication Management: : Patient states they do have transportation to pick 
up medications
Medication Management: : Patient states can afford medications
Planned post hospital services available for patient? : N/A
Ph[facility](s): : [facility]
Planned post hospital services covered by insurance plan? : N/A
Does Patient have transportation to get home and to follow-up medical 
appointments when discharged from the hospital? : Yes
Would patient like to participate in any Care Coordination programs (if 
applicable): : Not applicable
Does the patient have electricity at home? : Yes
Does the patient have running water in their house? : Yes
Equipment in use: : None
Mental health screen: : No mental health history
Psychosocial status: : Independent adult (65+)
Abuse/Neglect: : None
 
Resources / Services in place: : None
 
 
 
DCP Re-evaluation
QUESTION: ANSWER
Would patient like to participate in any Care Coordination programs (if 
applicable): : Not applicable
 
 
 
 
 
 
 
PATIENT:  KARLEE CROCKER
ENCOUNTER:  [id number]
MEDICAL RECORD#:  [id number]
ADMISSION DATE:  2021
DISCHARGE DATE:  
ATTENDING MD:  JODY BARNES
:   1935-Aug-12
AGE:  85
MARITAL STATUS:   W
DC PLAN ID:  [id number]
FACILITY:   [facility]
PRINTED ON: 21  16:50  CT
 
 
 
 
 
 
 
 
**All edits/amendments must be made on the electronic document**
 
DICTATION DATE: 21     : ANALIA  05/27/21 1650     
RPT#: [id number]                                DC DATE:        
                                               STATUS: ADM IN  
[facility]
 [place] [address]
***END OF REPORT***

## 2021-05-27 NOTE — NUR
Discharged home to self care in stable condition via w/c accompanied by
hospital staff and family member, no s/s of acute distress observed.

## 2021-05-27 NOTE — NUR
Lying in bed, awake/alert/oriented, T/R self with assist ad kenroy, cont of B/B
with use of purewick cath/bedpan ad kenroy, denies pain/other discomfort at this
time, call light/phone/water within reach, no s/s of acute distress observed.

## 2021-05-27 NOTE — MORECARE
CASE MANAGEMENT DISCHARGE SUMMARY
 
 
PATIENT: KARLEE CROCKER                     UNIT: [id number]
ACCOUNT#: [id number]                       ADM DATE: 21
AGE: 85     : 35  SEX: M            ROOM/BED: D.2110    
AUTHOR: MEGHANDOC                             PHYSICIAN:                               
 
REFERRING PHYSICIAN: JODY TRAN MD               
DATE OF SERVICE: 21
Case Management Discharge Planning Summary
 
 
COMMENTS 
ENTERED DATE:  21  16:41 CT
COMMENT TYPE:  Discharge Planning
REVIEWER: America James
CM met with patient to complete discharge planning assessment and offer 
availability of needed services. Patient states that he lives independently 
at  prior to admission. Daughter is present in room at time of DCP and 
verified that home environment is safe and has electricity and running water.
 Patient denies need for transportation and state that they have funds for 
services and medications if needed. PCP is Isidro and patient uses [facility] for pharmacy. CM offered and discussed home health, rehab 
services, and need for any medical equipment. Patient did not express need 
for offered services at this time. Transportation home will be provided by 
yamile Winkler ([phone]). Patient verbalized no need at time of 
discharge.
 
 
DCP REVIEW SUMMARY
ANTICIPATED D/C DATE: 2021
EXPECTED LOS : 1
CASE STATUS:  DCP Initiated
INITIAL REVIEW:  2021
INITIAL REVIEWER:   America James
FINAL DISCHARGE DISPOSITION: 01 : Home or Self Care (Routine Discharge)
FINAL REVIEWER:  
FINAL REVIEW DATE: 
 
  
 
DCP Focus Questions & Answers
 
DCP Screen
QUESTION: ANSWER
High Risk Factors: : None
Walking limitation:  Patient stated self rated walking limitation present? : 
No
Age: : 80 +
 
Prior living environment: : Lives Alone
Disability ranking: : Grade 1:  No significant disability
 
 
 
DCP Evaluation
QUESTION: ANSWER
Patient and/or caregiver agree upon recommended discharge plan? : Yes
Family / Caregiver's ability to cope with chronic illness: : a. Adequate 
(ability to meet patient's medical needs, ensures patient attends medical 
appts.)
Patient's current cognitive status: : *Oriented to person, place, situation, 
time and present
Patient's ability to cope with chronic illness : d. No chronic illness
Patient gives permission to discuss discharge plans with:  (name, 
relationship and number) : YAMILE WINKLER [phone]
 
Does the patient have the ability to pay for or attain post discharge needs 
/ services? : N/A
Functional screen assessment: : Basic needs can adequately be met by self
Family / Caregiver's ability to cope with chronic illness: : a. Adequate 
(ability to meet patient's medical needs, ensures patient attends medical 
appts.)
Physical Status: : Independent with ADL's
Equipment needed for post hospitalization: : None
Is there a likelihood that the patient will require additional services to 
return to the preadmission environment? : N/A
Living Arrangements: : Home Alone with Support
Results of this evaluation have been discussed with: : Patient
Patient with capacity for self-care or can be cared for in same environment 
as prior to hospitalization? : Yes
Baseline cognitive status: : *Oriented to person, place, situation, time and 
present
Physical environment modification needed / anticipated for discharge: : N/A
Medication Management: : Patient states can read and understand medication 
labels
Medication Management: : Patient states they do have transportation to pick 
up medications
Medication Management: : Patient states can afford medications
Planned post hospital services available for patient? : N/A
Ph[facility](s): : [facility]
Planned post hospital services covered by insurance plan? : N/A
Does Patient have transportation to get home and to follow-up medical 
appointments when discharged from the hospital? : Yes
Would patient like to participate in any Care Coordination programs (if 
applicable): : Not applicable
Does the patient have electricity at home? : Yes
Does the patient have running water in their house? : Yes
Equipment in use: : None
Mental health screen: : No mental health history
Psychosocial status: : Independent adult (65+)
Abuse/Neglect: : None
 
Resources / Services in place: : None
 
 
 
DCP Re-evaluation
QUESTION: ANSWER
Would patient like to participate in any Care Coordination programs (if 
applicable): : Not applicable
 
 
 
 
 
 
 
PATIENT:  KARLEE CROCKER
ENCOUNTER:  [id number]
MEDICAL RECORD#:  [id number]
ADMISSION DATE:  2021
DISCHARGE DATE:  2021
ATTENDING MD:  JODY BARNES
:   1935-Aug-12
AGE:  85
MARITAL STATUS:   W
DC PLAN ID:  [id number]
FACILITY:   [facility]
PRINTED ON: 21  17:54  CT
 
 
 
 
 
 
 
 
**All edits/amendments must be made on the electronic document**
 
DICTATION DATE: 21 6851     : ANALIA  21 1754     
RPT#: [id number]                                DC DATE:21
                                               STATUS: DIS IN  
[facility]
[address] [place] [address]
***END OF REPORT***

## 2021-05-28 NOTE — MORECARE
CASE MANAGEMENT DISCHARGE SUMMARY
 
 
PATIENT: KARLEE CROCKER                     UNIT: [id number]
ACCOUNT#: [id number]                       ADM DATE: 21
AGE: 85     : 35  SEX: M            ROOM/BED: D.2110    
AUTHOR: MEGHANDOC                             PHYSICIAN:                               
 
REFERRING PHYSICIAN: JODY TRAN MD               
DATE OF SERVICE: 21
Case Management Discharge Planning Summary
 
 
COMMENTS 
ENTERED DATE:  21  16:41 CT
COMMENT TYPE:  Discharge Planning
REVIEWER: America James
CM met with patient to complete discharge planning assessment and offer 
availability of needed services. Patient states that he lives independently 
at  prior to admission. Daughter is present in room at time of DCP and 
verified that home environment is safe and has electricity and running water.
 Patient denies need for transportation and state that they have funds for 
services and medications if needed. PCP is Isidro and patient uses [facility] for pharmacy. CM offered and discussed home health, rehab 
services, and need for any medical equipment. Patient did not express need 
for offered services at this time. Transportation home will be provided by 
yamile Winkler ([phone]). Patient verbalized no need at time of 
discharge.
 
 
DCP REVIEW SUMMARY
ANTICIPATED D/C DATE: 2021
EXPECTED LOS : 1
CASE STATUS:  DCP Initiated
INITIAL REVIEW:  2021
INITIAL REVIEWER:   America James
FINAL DISCHARGE DISPOSITION: 01 : Home or Self Care (Routine Discharge)
FINAL REVIEWER:  
FINAL REVIEW DATE: 
 
  
 
DCP Focus Questions & Answers
 
DCP Screen
QUESTION: ANSWER
High Risk Factors: : None
Walking limitation:  Patient stated self rated walking limitation present? : 
No
Age: : 80 +
 
Prior living environment: : Lives Alone
Disability ranking: : Grade 1:  No significant disability
 
 
 
DCP Evaluation
QUESTION: ANSWER
Patient gives permission to discuss discharge plans with:  (name, 
relationship and number) : YAMILE WINKLER [phone]
 
Patient's ability to cope with chronic illness : d. No chronic illness
Patient's current cognitive status: : *Oriented to person, place, situation, 
time and present
Family / Caregiver's ability to cope with chronic illness: : a. Adequate 
(ability to meet patient's medical needs, ensures patient attends medical 
appts.)
Patient and/or caregiver agree upon recommended discharge plan? : Yes
Physical Status: : Independent with ADL's
Family / Caregiver's ability to cope with chronic illness: : a. Adequate 
(ability to meet patient's medical needs, ensures patient attends medical 
appts.)
Functional screen assessment: : Basic needs can adequately be met by self
Does the patient have the ability to pay for or attain post discharge needs 
/ services? : N/A
Living Arrangements: : Home Alone with Support
Is there a likelihood that the patient will require additional services to 
return to the preadmission environment? : N/A
Equipment needed for post hospitalization: : None
Baseline cognitive status: : *Oriented to person, place, situation, time and 
present
Patient with capacity for self-care or can be cared for in same environment 
as prior to hospitalization? : Yes
Results of this evaluation have been discussed with: : Patient
Physical environment modification needed / anticipated for discharge: : N/A
Medication Management: : Patient states can afford medications
Medication Management: : Patient states they do have transportation to pick 
up medications
Medication Management: : Patient states can read and understand medication 
labels
Ph[facility](s): : [facility]
Planned post hospital services available for patient? : N/A
Does Patient have transportation to get home and to follow-up medical 
appointments when discharged from the hospital? : Yes
Planned post hospital services covered by insurance plan? : N/A
Would patient like to participate in any Care Coordination programs (if 
applicable): : Not applicable
Does the patient have electricity at home? : Yes
Does the patient have running water in their house? : Yes
Equipment in use: : None
Mental health screen: : No mental health history
Psychosocial status: : Independent adult (65+)
Abuse/Neglect: : None
 
Resources / Services in place: : None
 
 
 
DCP Re-evaluation
QUESTION: ANSWER
Would patient like to participate in any Care Coordination programs (if 
applicable): : Not applicable
 
 
 
 
 
 
 
PATIENT:  KARLEE CROCKER
ENCOUNTER:  [id number]
MEDICAL RECORD#:  [id number]
ADMISSION DATE:  2021
DISCHARGE DATE:  2021
ATTENDING MD:  JODY BARNES
:   1935-Aug-12
AGE:  85
MARITAL STATUS:   W
DC PLAN ID:  [id number]
FACILITY:   [facility]
PRINTED ON: 21  20:10  CT
 
 
 
 
 
 
 
 
**All edits/amendments must be made on the electronic document**
 
DICTATION DATE: 21     : ANALIA  21     
RPT#: [id number]                                DC DATE:21
                                               STATUS: DIS IN  
[facility]
[address] [place] [address]
***END OF REPORT***